# Patient Record
Sex: MALE | Race: WHITE | NOT HISPANIC OR LATINO | Employment: FULL TIME | URBAN - METROPOLITAN AREA
[De-identification: names, ages, dates, MRNs, and addresses within clinical notes are randomized per-mention and may not be internally consistent; named-entity substitution may affect disease eponyms.]

---

## 2018-02-05 LAB
ALT SERPL-CCNC: 42 U/L (ref 0–78)
CHOL/HDL RATIO - HISTORICAL: 3.6
CHOLEST SERPL-MCNC: 195 MG/DL (ref 0–200)
HDLC SERPL-MCNC: 54 MG/DL (ref 40–96)
LDL/HDL RATIO: 2.3
LDLC SERPL CALC-MCNC: 126 MG/DL (ref 0–130)
TRIGL SERPL-MCNC: 72 MG/DL (ref 30–200)
VLDL - CALC: 14.4 CALC (ref 5–40)

## 2018-11-20 ENCOUNTER — TRANSFERRED RECORDS (OUTPATIENT)
Dept: HEALTH INFORMATION MANAGEMENT | Facility: CLINIC | Age: 58
End: 2018-11-20

## 2018-11-20 LAB
ALBUMIN SERPL-MCNC: 3.8 G/DL (ref 3.4–5)
ALP SERPL-CCNC: 57 U/L (ref 0–116)
ALT SERPL-CCNC: 46 U/L (ref 0–78)
AST SERPL-CCNC: 20 U/L (ref 0–37)
BILIRUB SERPL-MCNC: 2.73 MG/DL (ref 0.2–1)
BUN SERPL-MCNC: 14 MG/DL (ref 7–18)
CALCIUM SERPL-MCNC: 9.1 MG/DL (ref 8.5–10.1)
CHLORIDE SERPLBLD-SCNC: 102 MMOL/L (ref 98–107)
CREAT SERPL-MCNC: 1.02 MG/DL (ref 0.6–1.3)
GLOBULIN: 3.3 G/DL (ref 2–4)
GLUCOSE SERPL-MCNC: 94 MG/DL (ref 70–99)
POTASSIUM SERPL-SCNC: 4.8 MMOL/L (ref 3.5–5.1)
PROT SERPL-MCNC: 7.1 G/DL (ref 6.4–8.2)
SODIUM SERPL-SCNC: 138 MMOL/L (ref 136–145)

## 2018-12-05 ENCOUNTER — HOSPITAL ENCOUNTER (OUTPATIENT)
Dept: CARDIOLOGY | Facility: CLINIC | Age: 58
Discharge: HOME | End: 2018-12-05
Attending: FAMILY MEDICINE | Admitting: FAMILY MEDICINE
Payer: COMMERCIAL

## 2018-12-05 DIAGNOSIS — I48.91 ATRIAL FIBRILLATION, UNSPECIFIED TYPE (H): ICD-10-CM

## 2018-12-05 PROCEDURE — 93306 TTE W/DOPPLER COMPLETE: CPT

## 2018-12-05 PROCEDURE — 93306 TTE W/DOPPLER COMPLETE: CPT | Mod: 26 | Performed by: INTERNAL MEDICINE

## 2019-04-01 ENCOUNTER — PRE VISIT (OUTPATIENT)
Dept: CARDIOLOGY | Facility: CLINIC | Age: 59
End: 2019-04-01

## 2019-04-03 ENCOUNTER — DOCUMENTATION ONLY (OUTPATIENT)
Dept: CARDIOLOGY | Facility: CLINIC | Age: 59
End: 2019-04-03

## 2019-04-08 ENCOUNTER — OFFICE VISIT (OUTPATIENT)
Dept: CARDIOLOGY | Facility: CLINIC | Age: 59
End: 2019-04-08
Payer: COMMERCIAL

## 2019-04-08 VITALS
WEIGHT: 232.5 LBS | BODY MASS INDEX: 31.49 KG/M2 | SYSTOLIC BLOOD PRESSURE: 158 MMHG | HEART RATE: 76 BPM | DIASTOLIC BLOOD PRESSURE: 98 MMHG | HEIGHT: 72 IN

## 2019-04-08 DIAGNOSIS — I48.0 PAROXYSMAL ATRIAL FIBRILLATION (H): Primary | ICD-10-CM

## 2019-04-08 PROCEDURE — 93000 ELECTROCARDIOGRAM COMPLETE: CPT | Performed by: INTERNAL MEDICINE

## 2019-04-08 PROCEDURE — 99204 OFFICE O/P NEW MOD 45 MIN: CPT | Performed by: INTERNAL MEDICINE

## 2019-04-08 RX ORDER — OMEGA-3 FATTY ACIDS/FISH OIL 300-1000MG
200 CAPSULE ORAL EVERY 4 HOURS PRN
COMMUNITY

## 2019-04-08 ASSESSMENT — MIFFLIN-ST. JEOR: SCORE: 1912.61

## 2019-04-08 NOTE — LETTER
4/8/2019    Troy Wilkins MD  [facility] [address]  [facility] [address]    RE: Dennys Odom       Dear Colleague,    I had the pleasure of seeing Dennys Roachpekandis in the [facility].    HPI and Plan:   This is a 58-year-old gentleman referred because of episodes of atrial fibrillation.    Around 2014 he developed palpitations one day although no other symptoms.  He felt his heart rate was rapid and irregular.  He saw his physician and was diagnosed with atrial fibrillation.  He was placed on anticoagulation at the episode went away after a day and a half and so a month later the anticoagulation was stopped.  He states he was unaware of any other episodes until November 2018 when he had the same symptoms.  Again a rapid irregular rhythm that he was aware of easily but he had no other symptoms and was able to do normal activities.  He again saw his clinic physician who got an EKG, which I reviewed, and this showed atrial fibrillation with rapid ventricular response and a heart rate in the 130s.  At that time was felt his chads vascular score was 0 and so anticoagulation was not recommended.  He was observed in the episode resolved again about a day and a half.  A subsequent echocardiogram was unremarkable although there was some possible mild left atrial enlargement.  He has had no further episodes since then.    He admits he is a significant snorer and somewhat sleeper but his wife has not commented on apneic episodes.  There is no history of hypertension, diabetes, dyslipidemia, COPD, tobacco.  He has 4 female siblings is some older same younger no history of atrial fibrillation or arrhythmias in them.  His parents had no cardiovascular issues when they were alive.      Most recent outside labs are reviewed from fall 2018 showing normal electrolytes, normal renal function, normal fasting glucose.  I do not see a TSH or thyroid functions.  He did  have mild hyperlipidemia with , HDL 54, total cholesterol 195, triglycerides 72     EKG today shows him to be in sinus rhythm and is essentially within normal limits.  Echo report is as noted above.    Impression/plan  1-apparently rare paroxysmal atrial fibrillation.  Agree he has no significant chads vascular score .  I do wonder if he has some sleep apnea that is predisposing him.  I also wonder if he is having more episodes during the night that he is not aware of - it would be useful to know this.  I have recommended the following:  -Overnight home oximetry  -A 14-day Zio patch.      Further recommendations will await the results of those.  If the studies are normal I would continue having him regular check his pulse but otherwise no further evaluation or management at this time.    2-I also spent some time will discuss with him appropriate lifestyle including exercise and Mediterranean type diet.          Orders Placed This Encounter   Procedures     EKG 12-lead complete w/read - Clinics (performed today)     Overnight oximetry study     Zio Patch Holter Adult Pediatric Greater than 48 hrs       Orders Placed This Encounter   Medications     ibuprofen (ADVIL) 200 MG capsule     Sig: Take 200 mg by mouth every 4 hours as needed for fever       There are no discontinued medications.      Encounter Diagnosis   Name Primary?     Paroxysmal atrial fibrillation (H) Yes       CURRENT MEDICATIONS:  Current Outpatient Medications   Medication Sig Dispense Refill     ibuprofen (ADVIL) 200 MG capsule Take 200 mg by mouth every 4 hours as needed for fever         ALLERGIES     Allergies   Allergen Reactions     Penicillins Unknown     Noted per records from [facility]       PAST MEDICAL HISTORY:  Past Medical History:   Diagnosis Date     Atrial fibrillation (H)      Chronic maxillary sinusitis      Hyperlipidemia      Right knee pain      Snoring        PAST SURGICAL HISTORY:  No past surgical  history on file.    FAMILY HISTORY:  Family History   Problem Relation Age of Onset     No Known Problems Mother      Hypertension Father      Obesity Father      No Known Problems Sister      No Known Problems Sister      No Known Problems Sister      No Known Problems Sister        SOCIAL HISTORY:  Social History     Socioeconomic History     Marital status:      Spouse name: None     Number of children: None     Years of education: None     Highest education level: None   Occupational History     None   Social Needs     Financial resource strain: None     Food insecurity:     Worry: None     Inability: None     Transportation needs:     Medical: None     Non-medical: None   Tobacco Use     Smoking status: Never Smoker     Smokeless tobacco: Former User   Substance and Sexual Activity     Alcohol use: Yes     Comment: a couple beers on weekends     Drug use: None     Sexual activity: None   Lifestyle     Physical activity:     Days per week: None     Minutes per session: None     Stress: None   Relationships     Social connections:     Talks on phone: None     Gets together: None     Attends Pentecostal service: None     Active member of club or organization: None     Attends meetings of clubs or organizations: None     Relationship status: None     Intimate partner violence:     Fear of current or ex partner: None     Emotionally abused: None     Physically abused: None     Forced sexual activity: None   Other Topics Concern     None   Social History Narrative     None       Review of Systems:  Skin:  Negative       Eyes:  Positive for glasses reading glasses  ENT:  Positive for hearing loss hearing aids; clearing his throat a lot  Respiratory:  Positive for   snores   Cardiovascular:  Negative      Gastroenterology: Negative      Genitourinary:  Negative      Musculoskeletal:  Positive for joint pain    Neurologic:  Negative      Psychiatric:  Positive for sleep disturbances    Heme/Lymph/Imm:  Positive for  allergies RX  Endocrine:  Negative        Physical Exam:  Vitals: BP (!) 158/98 (BP Location: Right arm, Patient Position: Chair, Cuff Size: Adult Large)   Pulse 76   Ht 1.829 m (6')   Wt 105.5 kg (232 lb 8 oz)   BMI 31.53 kg/m       Constitutional:  cooperative, alert and oriented, well developed, well nourished, in no acute distress        Skin:  warm and dry to the touch, no apparent skin lesions or masses noted          Head:  normocephalic, no masses or lesions        Eyes:  pupils equal and round;sclera white;no xanthalasma;EOMS intact        Lymph:      ENT:  no pallor or cyanosis, dentition good        Neck:  carotid pulses are full and equal bilaterally, JVP normal, no carotid bruit        Respiratory:  normal breath sounds, clear to auscultation, normal A-P diameter, normal symmetry, normal respiratory excursion, no use of accessory muscles         Cardiac: regular rhythm, normal S1/S2, no S3 or S4, apical impulse not displaced, no murmurs, gallops or rubs                pulses full and equal, no bruits auscultated                                        GI:  abdomen soft;BS normoactive;no HSM;non-tender;no bruits        Extremities and Muscular Skeletal:  no deformities, clubbing, cyanosis, erythema observed;no edema              Neurological:  no gross motor deficits        Psych:  affect appropriate, oriented to time, person and place        CC  No referring provider defined for this encounter.                Thank you for allowing me to participate in the care of your patient.      Sincerely,     Jason Desir MD     [facility]    cc:   No referring provider defined for this encounter.

## 2019-04-08 NOTE — PROGRESS NOTES
HPI and Plan:   This is a 58-year-old gentleman referred because of episodes of atrial fibrillation.    Around 2014 he developed palpitations one day although no other symptoms.  He felt his heart rate was rapid and irregular.  He saw his physician and was diagnosed with atrial fibrillation.  He was placed on anticoagulation at the episode went away after a day and a half and so a month later the anticoagulation was stopped.  He states he was unaware of any other episodes until November 2018 when he had the same symptoms.  Again a rapid irregular rhythm that he was aware of easily but he had no other symptoms and was able to do normal activities.  He again saw his clinic physician who got an EKG, which I reviewed, and this showed atrial fibrillation with rapid ventricular response and a heart rate in the 130s.  At that time was felt his chads vascular score was 0 and so anticoagulation was not recommended.  He was observed in the episode resolved again about a day and a half.  A subsequent echocardiogram was unremarkable although there was some possible mild left atrial enlargement.  He has had no further episodes since then.    He admits he is a significant snorer and somewhat sleeper but his wife has not commented on apneic episodes.  There is no history of hypertension, diabetes, dyslipidemia, COPD, tobacco.  He has 4 female siblings is some older same younger no history of atrial fibrillation or arrhythmias in them.  His parents had no cardiovascular issues when they were alive.      Most recent outside labs are reviewed from fall 2018 showing normal electrolytes, normal renal function, normal fasting glucose.  I do not see a TSH or thyroid functions.  He did have mild hyperlipidemia with , HDL 54, total cholesterol 195, triglycerides 72     EKG today shows him to be in sinus rhythm and is essentially within normal limits.  Echo report is as noted above.    Impression/plan  1-apparently rare paroxysmal  atrial fibrillation.  Agree he has no significant chads vascular score .  I do wonder if he has some sleep apnea that is predisposing him.  I also wonder if he is having more episodes during the night that he is not aware of - it would be useful to know this.  I have recommended the following:  -Overnight home oximetry  -A 14-day Zio patch.      Further recommendations will await the results of those.  If the studies are normal I would continue having him regular check his pulse but otherwise no further evaluation or management at this time.    2-I also spent some time will discuss with him appropriate lifestyle including exercise and Mediterranean type diet.          Orders Placed This Encounter   Procedures     EKG 12-lead complete w/read - Clinics (performed today)     Overnight oximetry study     Zio Patch Holter Adult Pediatric Greater than 48 hrs       Orders Placed This Encounter   Medications     ibuprofen (ADVIL) 200 MG capsule     Sig: Take 200 mg by mouth every 4 hours as needed for fever       There are no discontinued medications.      Encounter Diagnosis   Name Primary?     Paroxysmal atrial fibrillation (H) Yes       CURRENT MEDICATIONS:  Current Outpatient Medications   Medication Sig Dispense Refill     ibuprofen (ADVIL) 200 MG capsule Take 200 mg by mouth every 4 hours as needed for fever         ALLERGIES     Allergies   Allergen Reactions     Penicillins Unknown     Noted per records from [facility]       PAST MEDICAL HISTORY:  Past Medical History:   Diagnosis Date     Atrial fibrillation (H)      Chronic maxillary sinusitis      Hyperlipidemia      Right knee pain      Snoring        PAST SURGICAL HISTORY:  No past surgical history on file.    FAMILY HISTORY:  Family History   Problem Relation Age of Onset     No Known Problems Mother      Hypertension Father      Obesity Father      No Known Problems Sister      No Known Problems Sister      No Known Problems Sister      No  Known Problems Sister        SOCIAL HISTORY:  Social History     Socioeconomic History     Marital status:      Spouse name: None     Number of children: None     Years of education: None     Highest education level: None   Occupational History     None   Social Needs     Financial resource strain: None     Food insecurity:     Worry: None     Inability: None     Transportation needs:     Medical: None     Non-medical: None   Tobacco Use     Smoking status: Never Smoker     Smokeless tobacco: Former User   Substance and Sexual Activity     Alcohol use: Yes     Comment: a couple beers on weekends     Drug use: None     Sexual activity: None   Lifestyle     Physical activity:     Days per week: None     Minutes per session: None     Stress: None   Relationships     Social connections:     Talks on phone: None     Gets together: None     Attends Buddhist service: None     Active member of club or organization: None     Attends meetings of clubs or organizations: None     Relationship status: None     Intimate partner violence:     Fear of current or ex partner: None     Emotionally abused: None     Physically abused: None     Forced sexual activity: None   Other Topics Concern     None   Social History Narrative     None       Review of Systems:  Skin:  Negative       Eyes:  Positive for glasses reading glasses  ENT:  Positive for hearing loss hearing aids; clearing his throat a lot  Respiratory:  Positive for   snores   Cardiovascular:  Negative      Gastroenterology: Negative      Genitourinary:  Negative      Musculoskeletal:  Positive for joint pain    Neurologic:  Negative      Psychiatric:  Positive for sleep disturbances    Heme/Lymph/Imm:  Positive for allergies RX  Endocrine:  Negative        Physical Exam:  Vitals: BP (!) 158/98 (BP Location: Right arm, Patient Position: Chair, Cuff Size: Adult Large)   Pulse 76   Ht 1.829 m (6')   Wt 105.5 kg (232 lb 8 oz)   BMI 31.53 kg/m      Constitutional:   cooperative, alert and oriented, well developed, well nourished, in no acute distress        Skin:  warm and dry to the touch, no apparent skin lesions or masses noted          Head:  normocephalic, no masses or lesions        Eyes:  pupils equal and round;sclera white;no xanthalasma;EOMS intact        Lymph:      ENT:  no pallor or cyanosis, dentition good        Neck:  carotid pulses are full and equal bilaterally, JVP normal, no carotid bruit        Respiratory:  normal breath sounds, clear to auscultation, normal A-P diameter, normal symmetry, normal respiratory excursion, no use of accessory muscles         Cardiac: regular rhythm, normal S1/S2, no S3 or S4, apical impulse not displaced, no murmurs, gallops or rubs                pulses full and equal, no bruits auscultated                                        GI:  abdomen soft;BS normoactive;no HSM;non-tender;no bruits        Extremities and Muscular Skeletal:  no deformities, clubbing, cyanosis, erythema observed;no edema              Neurological:  no gross motor deficits        Psych:  affect appropriate, oriented to time, person and place        CC  No referring provider defined for this encounter.

## 2019-04-08 NOTE — LETTER
4/8/2019    Troy Wilkins MD  [facility] [address]  [facility] [address]    RE: Dennys Odom       Dear Colleague,    I had the pleasure of seeing Dennys Roachpekandis in the [facility].    HPI and Plan:   This is a 58-year-old gentleman referred because of episodes of atrial fibrillation.    Around 2014 he developed palpitations one day although no other symptoms.  He felt his heart rate was rapid and irregular.  He saw his physician and was diagnosed with atrial fibrillation.  He was placed on anticoagulation at the episode went away after a day and a half and so a month later the anticoagulation was stopped.  He states he was unaware of any other episodes until November 2018 when he had the same symptoms.  Again a rapid irregular rhythm that he was aware of easily but he had no other symptoms and was able to do normal activities.  He again saw his clinic physician who got an EKG, which I reviewed, and this showed atrial fibrillation with rapid ventricular response and a heart rate in the 130s.  At that time was felt his chads vascular score was 0 and so anticoagulation was not recommended.  He was observed in the episode resolved again about a day and a half.  A subsequent echocardiogram was unremarkable although there was some possible mild left atrial enlargement.  He has had no further episodes since then.    He admits he is a significant snorer and somewhat sleeper but his wife has not commented on apneic episodes.  There is no history of hypertension, diabetes, dyslipidemia, COPD, tobacco.  He has 4 female siblings is some older same younger no history of atrial fibrillation or arrhythmias in them.  His parents had no cardiovascular issues when they were alive.      Most recent outside labs are reviewed from fall 2018 showing normal electrolytes, normal renal function, normal fasting glucose.  I do not see a TSH or thyroid functions.  He did  have mild hyperlipidemia with , HDL 54, total cholesterol 195, triglycerides 72     EKG today shows him to be in sinus rhythm and is essentially within normal limits.  Echo report is as noted above.    Impression/plan  1-apparently rare paroxysmal atrial fibrillation.  Agree he has no significant chads vascular score .  I do wonder if he has some sleep apnea that is predisposing him.  I also wonder if he is having more episodes during the night that he is not aware of - it would be useful to know this.  I have recommended the following:  -Overnight home oximetry  -A 14-day Zio patch.      Further recommendations will await the results of those.  If the studies are normal I would continue having him regular check his pulse but otherwise no further evaluation or management at this time.    2-I also spent some time will discuss with him appropriate lifestyle including exercise and Mediterranean type diet.          Orders Placed This Encounter   Procedures     EKG 12-lead complete w/read - Clinics (performed today)     Overnight oximetry study     Zio Patch Holter Adult Pediatric Greater than 48 hrs       Orders Placed This Encounter   Medications     ibuprofen (ADVIL) 200 MG capsule     Sig: Take 200 mg by mouth every 4 hours as needed for fever       There are no discontinued medications.      Encounter Diagnosis   Name Primary?     Paroxysmal atrial fibrillation (H) Yes       CURRENT MEDICATIONS:  Current Outpatient Medications   Medication Sig Dispense Refill     ibuprofen (ADVIL) 200 MG capsule Take 200 mg by mouth every 4 hours as needed for fever         ALLERGIES     Allergies   Allergen Reactions     Penicillins Unknown     Noted per records from [facility]       PAST MEDICAL HISTORY:  Past Medical History:   Diagnosis Date     Atrial fibrillation (H)      Chronic maxillary sinusitis      Hyperlipidemia      Right knee pain      Snoring        PAST SURGICAL HISTORY:  No past surgical  history on file.    FAMILY HISTORY:  Family History   Problem Relation Age of Onset     No Known Problems Mother      Hypertension Father      Obesity Father      No Known Problems Sister      No Known Problems Sister      No Known Problems Sister      No Known Problems Sister        SOCIAL HISTORY:  Social History     Socioeconomic History     Marital status:      Spouse name: None     Number of children: None     Years of education: None     Highest education level: None   Occupational History     None   Social Needs     Financial resource strain: None     Food insecurity:     Worry: None     Inability: None     Transportation needs:     Medical: None     Non-medical: None   Tobacco Use     Smoking status: Never Smoker     Smokeless tobacco: Former User   Substance and Sexual Activity     Alcohol use: Yes     Comment: a couple beers on weekends     Drug use: None     Sexual activity: None   Lifestyle     Physical activity:     Days per week: None     Minutes per session: None     Stress: None   Relationships     Social connections:     Talks on phone: None     Gets together: None     Attends Shinto service: None     Active member of club or organization: None     Attends meetings of clubs or organizations: None     Relationship status: None     Intimate partner violence:     Fear of current or ex partner: None     Emotionally abused: None     Physically abused: None     Forced sexual activity: None   Other Topics Concern     None   Social History Narrative     None       Review of Systems:  Skin:  Negative       Eyes:  Positive for glasses reading glasses  ENT:  Positive for hearing loss hearing aids; clearing his throat a lot  Respiratory:  Positive for   snores   Cardiovascular:  Negative      Gastroenterology: Negative      Genitourinary:  Negative      Musculoskeletal:  Positive for joint pain    Neurologic:  Negative      Psychiatric:  Positive for sleep disturbances    Heme/Lymph/Imm:  Positive for  allergies RX  Endocrine:  Negative        Physical Exam:  Vitals: BP (!) 158/98 (BP Location: Right arm, Patient Position: Chair, Cuff Size: Adult Large)   Pulse 76   Ht 1.829 m (6')   Wt 105.5 kg (232 lb 8 oz)   BMI 31.53 kg/m       Constitutional:  cooperative, alert and oriented, well developed, well nourished, in no acute distress        Skin:  warm and dry to the touch, no apparent skin lesions or masses noted          Head:  normocephalic, no masses or lesions        Eyes:  pupils equal and round;sclera white;no xanthalasma;EOMS intact        Lymph:      ENT:  no pallor or cyanosis, dentition good        Neck:  carotid pulses are full and equal bilaterally, JVP normal, no carotid bruit        Respiratory:  normal breath sounds, clear to auscultation, normal A-P diameter, normal symmetry, normal respiratory excursion, no use of accessory muscles         Cardiac: regular rhythm, normal S1/S2, no S3 or S4, apical impulse not displaced, no murmurs, gallops or rubs                pulses full and equal, no bruits auscultated                                        GI:  abdomen soft;BS normoactive;no HSM;non-tender;no bruits        Extremities and Muscular Skeletal:  no deformities, clubbing, cyanosis, erythema observed;no edema              Neurological:  no gross motor deficits        Psych:  affect appropriate, oriented to time, person and place          Thank you for allowing me to participate in the care of your patient.    Sincerely,     Jason Desir MD     [facility]

## 2019-05-08 ENCOUNTER — TRANSFERRED RECORDS (OUTPATIENT)
Dept: HEALTH INFORMATION MANAGEMENT | Facility: CLINIC | Age: 59
End: 2019-05-08

## 2019-05-08 ENCOUNTER — HOSPITAL ENCOUNTER (OUTPATIENT)
Dept: CARDIOLOGY | Facility: CLINIC | Age: 59
Discharge: HOME | End: 2019-05-08
Attending: INTERNAL MEDICINE | Admitting: INTERNAL MEDICINE
Payer: COMMERCIAL

## 2019-05-08 DIAGNOSIS — I48.0 PAROXYSMAL ATRIAL FIBRILLATION (H): ICD-10-CM

## 2019-05-08 PROCEDURE — 0296T ZIO PATCH HOLTER ADULT PEDIATRIC GREATER THAN 48 HRS: CPT

## 2019-05-08 PROCEDURE — 0298T ZIO PATCH HOLTER ADULT PEDIATRIC GREATER THAN 48 HRS: CPT | Performed by: INTERNAL MEDICINE

## 2019-05-10 DIAGNOSIS — R79.81 ABNORMAL PULSE OXIMETRY: Primary | ICD-10-CM

## 2019-05-10 DIAGNOSIS — I25.10 CORONARY ARTERY DISEASE INVOLVING NATIVE CORONARY ARTERY OF NATIVE HEART WITHOUT ANGINA PECTORIS: ICD-10-CM

## 2019-06-05 ENCOUNTER — TRANSFERRED RECORDS (OUTPATIENT)
Dept: HEALTH INFORMATION MANAGEMENT | Facility: CLINIC | Age: 59
End: 2019-06-05
Payer: COMMERCIAL

## 2022-03-24 ENCOUNTER — TRANSCRIBE ORDERS (OUTPATIENT)
Dept: OTHER | Age: 62
End: 2022-03-24
Payer: COMMERCIAL

## 2022-03-24 DIAGNOSIS — R49.9 HOARSENESS OR CHANGING VOICE: Primary | ICD-10-CM

## 2022-03-24 DIAGNOSIS — J38.3 DYSPHONIA, SPASMODIC: ICD-10-CM

## 2022-03-30 NOTE — TELEPHONE ENCOUNTER
"FUTURE VISIT INFORMATION      FUTURE VISIT INFORMATION:    Date: 6/14/22    Time: 1PM    [place] [facility] INFORMATION:    Referring provider:  Miguel Pizano MD    Referring providers clinic:  [facility]     Reason for visit/diagnosis  Hoarseness or changing voice, dysphonia, spasmodic; referred by Miguel Pizano MD per pt    RECORDS REQUESTED FROM:       Clinic name Comments Records Status Imaging Status   [facility]    [address]    [place] [address]    [phone]  3/23/22 note from Miguel Pizano MD    \"He states he was previously scoped in [place]\" Care everywhere     [facility]  6/5/2019, 10/24/2018 note from Dr Bruce Louie  Mailed out ADEN 5/5/22    req 5/18/22 - received 5/25/22 5/5/22 mailed out ADEN to patient - [facility]   5/18/22 8:47AM received signed ADEN via email, sent a request for recs - [facility]   5/25/22 11:34AM recs received from [facility], sent to scan - [facility]   "

## 2022-04-30 ENCOUNTER — HEALTH MAINTENANCE LETTER (OUTPATIENT)
Age: 62
End: 2022-04-30

## 2022-05-05 NOTE — TELEPHONE ENCOUNTER
Left a message with patient to see if he has any outside records. Note from Dr Pizano indicates that he was previously scoped. Left my direct number for call back [phone] (ok to leave detailed voicemail). If patient is ok with us trying to obtain these records, will need to know if he wants an ADEN emailed or mailed out to him to sign.     [facility]      Update 2:23PM:   Spoke with patient, he was seen by [facility] in 2018. Ok with an ADEN mailed to him. Confirmed address and ADEN will be sent out via [facility] with instructions on how to get it back to me.    [address] [place] [address]    *Trackin    [facility]   Clinic

## 2022-06-14 ENCOUNTER — OFFICE VISIT (OUTPATIENT)
Dept: OTOLARYNGOLOGY | Facility: CLINIC | Age: 62
End: 2022-06-14
Payer: COMMERCIAL

## 2022-06-14 ENCOUNTER — PRE VISIT (OUTPATIENT)
Dept: OTOLARYNGOLOGY | Facility: CLINIC | Age: 62
End: 2022-06-14

## 2022-06-14 ENCOUNTER — THERAPY VISIT (OUTPATIENT)
Dept: SPEECH THERAPY | Facility: CLINIC | Age: 62
End: 2022-06-14

## 2022-06-14 ENCOUNTER — VIRTUAL VISIT (OUTPATIENT)
Dept: OTOLARYNGOLOGY | Facility: CLINIC | Age: 62
End: 2022-06-14
Payer: COMMERCIAL

## 2022-06-14 VITALS
DIASTOLIC BLOOD PRESSURE: 89 MMHG | BODY MASS INDEX: 30.1 KG/M2 | HEIGHT: 71 IN | HEART RATE: 71 BPM | WEIGHT: 215 LBS | SYSTOLIC BLOOD PRESSURE: 166 MMHG | TEMPERATURE: 99 F | OXYGEN SATURATION: 99 %

## 2022-06-14 DIAGNOSIS — R49.9 HOARSENESS OR CHANGING VOICE: ICD-10-CM

## 2022-06-14 DIAGNOSIS — R13.10 DYSPHAGIA, UNSPECIFIED TYPE: Primary | ICD-10-CM

## 2022-06-14 DIAGNOSIS — R49.0 DYSPHONIA: Primary | ICD-10-CM

## 2022-06-14 DIAGNOSIS — R13.12 OROPHARYNGEAL DYSPHAGIA: ICD-10-CM

## 2022-06-14 DIAGNOSIS — J38.3 ADDUCTOR SPASMODIC DYSPHONIA: ICD-10-CM

## 2022-06-14 DIAGNOSIS — R49.0 HOARSENESS: Primary | ICD-10-CM

## 2022-06-14 DIAGNOSIS — R49.0 DYSPHONIA: ICD-10-CM

## 2022-06-14 DIAGNOSIS — J38.3 DYSPHONIA, SPASMODIC: ICD-10-CM

## 2022-06-14 PROCEDURE — 92612 ENDOSCOPY SWALLOW (FEES) VID: CPT | Mod: GN | Performed by: OTOLARYNGOLOGY

## 2022-06-14 PROCEDURE — 31579 LARYNGOSCOPY TELESCOPIC: CPT | Mod: 51 | Performed by: OTOLARYNGOLOGY

## 2022-06-14 PROCEDURE — 92610 EVALUATE SWALLOWING FUNCTION: CPT | Mod: GN | Performed by: SPEECH-LANGUAGE PATHOLOGIST

## 2022-06-14 PROCEDURE — 92613 ENDOSCOPY SWALLOW (FEES) I&R: CPT | Performed by: OTOLARYNGOLOGY

## 2022-06-14 PROCEDURE — 92524 BEHAVRAL QUALIT ANALYS VOICE: CPT | Mod: GN | Performed by: SPEECH-LANGUAGE PATHOLOGIST

## 2022-06-14 PROCEDURE — 99204 OFFICE O/P NEW MOD 45 MIN: CPT | Mod: 25 | Performed by: OTOLARYNGOLOGY

## 2022-06-14 ASSESSMENT — PAIN SCALES - GENERAL: PAINLEVEL: NO PAIN (0)

## 2022-06-14 NOTE — PROGRESS NOTES
"Dennys Odom is a 61 year old male who is being evaluated via a billable video visit.      The patient has been notified and verbally consented to the following:     This video visit will be conducted between you and your provider.    Patient has opted to conduct today's video visit vs an in-person appointment, and is not able to attend due to possible exposure to COVID-19.      If during the course of the call the provider feels a video visit is not appropriate, you will not be charged for this service.    Call initiated at: 1:00  Type of Video Platform Used: Ontodia  Location of provider: Residence  Location of patient: [facility]  Evaluation report    Clinician: Wyatt Oneal M.M., M.A., CCC/SLP  Seen in conjunction with: Dr. Christianson  Referring physician:  Dr. Pizano  Patient: Dennys Odom  Date of Visit: 6/14/2022    HISTORY  Chief complaint: Dennys Odom is a 61 year old gentleman presenting today for evaluation of voice.    Chart Review: He was seen most recently by Dr. Miguel Pizano (an outside ENT) and at that time he stated his voice has been hoarse off and on for several years, and that it is not \"working right\" and is not completely normal as it used to be.  He was previously seen for this in 2018/2019 and told that everything was normal but given vocal hygiene recommendations.  Laryngeal evaluation completed on 3/23/2022 showed essentially healthy laryngeal vocal fold mucosa but with a slight vocal tremor.  There was concern for possible spasmodic dysphonia, and the patient was referred to our clinic for further evaluation and treatment as warranted.    Today he reports that symptoms began 3-4 years ago. Short phrases are OK, but as he carries on a conversation his voice gets tight. During winter it seems to get worse (when breathing cold air). Friends ask what's wrong with his voice. Feels that he is avoiding using his voice.  A little better " "in the morning and worsens as the day progresses. Might get a bit easier with an extra drink.      Patient denies significant dyspnea, dysphagia, cough and pain.     Past Medical History:   Diagnosis Date     Atrial fibrillation (H)      Chronic maxillary sinusitis      Hyperlipidemia      Right knee pain      Snoring      No past surgical history on file.    OBJECTIVE  PATIENT REPORTED MEASURES  Patient Supplied Answers To VHI Questionnaire  Voice Handicap Index (VHI-10) 6/8/2022   My voice makes it difficult for people to hear me 2   People have difficulty understanding me in a noisy room 2   My voice difficulties restrict my personal and social life.  2   I feel left out of conversations because of my voice 0   My voice problem causes me to lose income 0   I feel as though I have to strain to produce voice 2   The clarity of my voice is unpredictable 2   My voice problem upsets me 2   My voice makes me feel handicapped 0   People ask, \"What's wrong with your voice?\" 2   VHI-10 14     Patient Supplied Answers To CSI Questionnaire  Cough Severity Index (CSI) 6/8/2022   My cough is worse when I lie down 0   My coughing problem causes me to restrict my personal and social life 0   I tend to avoid places because of my cough problem 0   I feel embarrassed because of my coughing problem 0   People ask, ''What's wrong?'' because I cough a lot 0   I run out of air when I cough 0   My coughing problem affects my voice 0   My coughing problem limits my physical activity 0   My coughing problem upsets me 0   People ask me if I am sick because I cough a lot 0   CSI Score 0     Patient Supplied Answers To EAT Questionnaire  Eating Assessment Tool (EAT-10) 6/8/2022   My swallowing problem has caused me to lose weight 0   My swallowing problem interferes with my ability to go out for meals 0   Swallowing liquids takes extra effort 0   Swallowing solids takes extra effort 0   Swallowing pills takes extra effort 0   Swallowing is " painful 0   The pleasure of eating is affected by my swallowing 0   When I swallow food sticks in my throat 0   I cough when I eat 0   Swallowing is stressful 0   EAT-10 0     PERCEPTUAL EVALUATION (CPT 54478)  POSTURE / TENSION:     neck and shoulders    BREATHING:     appears within normal limits and adequate     VOICE:    Roughness: Mild to moderate Intermittent    Breathiness: Minimal    Strain: Moderate to severe Intermittent     Spasm: Moderate Intermittent associated with voice loaded stimuli consistently across trials and varied order    MPT    /s/ - 14 seconds    /z/ - 34 seconds     Loudness    Conversational speech:  WNL    Pitch:    Sustained phonation: /z/ - 220 Hz    Conversational speech:  Mildly elevated but within functional limits    Pitch glide:       Hz    Resonance:    Conversational speech:  laryngeal pharyngeal resonance    Singing vs. Speech:  Consistent across contexts        CAPE-V Overall Severity:  57/100    COUGH/THROAT CLEARING:    Occasional    Dry    THERAPY PROBES: Improvement was elicited with coordination of respiration and phonation    LARYNGEAL EXAMINATION  Procedure: Flexible endoscopy with chip-tip technology with stroboscopy, right nostril; topical anesthesia with 3% Lidocaine and 0.25% phenylephrine was applied.   Performed by: Dr. Kathi Christianson  The laryngeal and pharyngeal structures were evaluated for gross appearance, mobility, function, and focal lesions / abnormalities of the associated mucosa.  Stroboscopy was warranted to evaluate closure, symmetry, and vibratory characteristics of the vocal folds.  All findings were within normal limits with the exception of the following salient features:     Esentially healthy laryngeal and vocal fold mucosa    Right true vocal fold frequently adopts a near midline posture intermittently    Severe 4 way constrictive hyperfunction during phonatory tasks    Most notable during voiced initial sounds    Decreasing during voiceless  initial sounds    Does improve with cues for high flow    Stroboscopy demonstrates:    Anterior gap    Posterior aspect of the vocal fold appears less dynamic with regards to amplitude of mucosal wave    Mild reduction of amplitude mucosal wave anteriorly    Essentially symmetric    Intermittent aperiodicity    The laryngeal exam was reviewed with Mr. Odom, and I provided pertinent explanations, as well as written and oral information.    ASSESSMENT / PLAN  IMPRESSIONS: Dennys Odom is presenting today with voice changes that began 3 to 4 years ago without inciting incident.  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 Laryngeal evaluation shows severe four-way constrictive supraglottic hyperfunction present across many vocal tasks but worsening in a spastic-like manner on voiced initial sounds.  Across numerous repetitions and variations in order patient consistently reported increased effort on voiced initial versus voiceless initial loaded stimuli.  These findings are consistent with perceptual evaluation which is dominated by strain with intermittent exacerbations on voiced sounds.  Patient was stimulable for improvement with focus on increasing respiratory flow but moments of strain are clearly still appreciated.    DETAILED RECOMMENDATIONS:     Various treatment options including taking no action, Botox injections, and speech therapy alone or a combination thereof were discussed with the patient.      Both Dr. Christianson and I feel strongly that a joint approach of Botox with adjuvant speech therapy is going to be required to achieve best results, but patient was encouraged to think about these options before pursuing, and at this point he would like to move forward with speech therapy and reengage for possible  Botox in the future.      He demonstrates a Good prognosis for improvement given adherence to therapeutic recommendations.   o Positive indicators: positive response to therapy probes diagnosis is known to respond to treatment  o Negative indicators: Definite neurogenic component to symptoms    DURATION / FREQUENCY: 4 biweekly and 2 monthly one-hour sessions    Research: This patient is not a candidate.      GOALS:  Patient goal:     To improve and maintain a healthy voice quality    To understand the problem and fix it as much as possible    Short-term goal(s): Within the first 4 sessions, Mr. Odom:    will demonstrate assigned laryngeal massage techniques with 80% accuracy or better with no clinician support    will utilize silent inhalation with good low-respiratory engagement 75% of the time during therapy tasks with minimal clinician support    will demonstrate semi-occluded vocal tract (SOVT) exercises with at least 80% accuracy with no clinician support    will accurately identify target vs. habitual voice quality during therapy tasks in 4 out of 5 trials with no clinician support    will demonstrate the ability to alternate between target and habitual voice quality given clinician cue 75% of the time during therapy tasks    Long-term goal(s): In 6 months, Mr. Odom will:    Report a week of typical activities, in which Dysphonia does not exceed a level of 3 out of 10, 80% of the time    This treatment plan was developed with the patient who agreed with the recommendations.    TOTAL SERVICE TIME: 60 minutes  EVALUATION OF VOICE AND RESONANCE (02840)  NO CHARGE FACILITY FEE (47198)    Wyatt Oneal M.M., M.A., CCC-SLP  Speech-Language Pathologist  Certificate of Vocology  [phone]    *this report was created in part through the use of computerized dictation software, and though reviewed following completion, some typographic errors may persist.  If there is confusion regarding any of this  notes contents, please contact me for clarification.*

## 2022-06-14 NOTE — NURSING NOTE
"Chief Complaint   Patient presents with     Consult     Hoarseness and spasmodic dysphonia    Blood pressure (!) 166/89, pulse 71, temperature 99  F (37.2  C), temperature source Temporal, height 1.803 m (5' 11\"), weight 97.5 kg (215 lb), SpO2 99 %. Giovanna Louie, EMT  "

## 2022-06-14 NOTE — LETTER
"6/14/2022       RE: Dennys Odom  [address] [facility] [address]     Dear Colleague,    Thank you for referring your patient, Dennys Odom, to the [facility] [place] [facility]. Please see a copy of my visit note below.    Dennys Odom is a 61 year old male who is being evaluated via a billable video visit.      The patient has been notified and verbally consented to the following:     This video visit will be conducted between you and your provider.    Patient has opted to conduct today's video visit vs an in-person appointment, and is not able to attend due to possible exposure to COVID-19.      If during the course of the call the provider feels a video visit is not appropriate, you will not be charged for this service.    Call initiated at: 1:00  Type of Video Platform Used: ThinkSmart  Location of provider: Residence  Location of patient: [facility]  Evaluation report    Clinician: Wyatt Oneal M.M., M.A., CCC/SLP  Seen in conjunction with: Dr. Christianson  Referring physician:  Dr. Pizano  Patient: Dennys Odom  Date of Visit: 6/14/2022    HISTORY  Chief complaint: Dennys Odom is a 61 year old gentleman presenting today for evaluation of voice.    Chart Review: He was seen most recently by Dr. Miguel Pizano (an outside ENT) and at that time he stated his voice has been hoarse off and on for several years, and that it is not \"working right\" and is not completely normal as it used to be.  He was previously seen for this in 2018/2019 and told that everything was normal but given vocal hygiene recommendations.  Laryngeal evaluation completed on 3/23/2022 showed essentially healthy laryngeal vocal fold mucosa but with a slight vocal tremor.  There was concern for possible spasmodic dysphonia, and the patient was referred to our clinic for further evaluation " "and treatment as warranted.    Today he reports that symptoms began 3-4 years ago. Short phrases are OK, but as he carries on a conversation his voice gets tight. During winter it seems to get worse (when breathing cold air). Friends ask what's wrong with his voice. Feels that he is avoiding using his voice.  A little better in the morning and worsens as the day progresses. Might get a bit easier with an extra drink.      Patient denies significant dyspnea, dysphagia, cough and pain.     Past Medical History:   Diagnosis Date     Atrial fibrillation (H)      Chronic maxillary sinusitis      Hyperlipidemia      Right knee pain      Snoring      No past surgical history on file.    OBJECTIVE  PATIENT REPORTED MEASURES  Patient Supplied Answers To VHI Questionnaire  Voice Handicap Index (VHI-10) 6/8/2022   My voice makes it difficult for people to hear me 2   People have difficulty understanding me in a noisy room 2   My voice difficulties restrict my personal and social life.  2   I feel left out of conversations because of my voice 0   My voice problem causes me to lose income 0   I feel as though I have to strain to produce voice 2   The clarity of my voice is unpredictable 2   My voice problem upsets me 2   My voice makes me feel handicapped 0   People ask, \"What's wrong with your voice?\" 2   VHI-10 14     Patient Supplied Answers To CSI Questionnaire  Cough Severity Index (CSI) 6/8/2022   My cough is worse when I lie down 0   My coughing problem causes me to restrict my personal and social life 0   I tend to avoid places because of my cough problem 0   I feel embarrassed because of my coughing problem 0   People ask, ''What's wrong?'' because I cough a lot 0   I run out of air when I cough 0   My coughing problem affects my voice 0   My coughing problem limits my physical activity 0   My coughing problem upsets me 0   People ask me if I am sick because I cough a lot 0   CSI Score 0     Patient Supplied Answers To " EAT Questionnaire  Eating Assessment Tool (EAT-10) 6/8/2022   My swallowing problem has caused me to lose weight 0   My swallowing problem interferes with my ability to go out for meals 0   Swallowing liquids takes extra effort 0   Swallowing solids takes extra effort 0   Swallowing pills takes extra effort 0   Swallowing is painful 0   The pleasure of eating is affected by my swallowing 0   When I swallow food sticks in my throat 0   I cough when I eat 0   Swallowing is stressful 0   EAT-10 0     PERCEPTUAL EVALUATION (CPT 07534)  POSTURE / TENSION:     neck and shoulders    BREATHING:     appears within normal limits and adequate     VOICE:    Roughness: Mild to moderate Intermittent    Breathiness: Minimal    Strain: Moderate to severe Intermittent     Spasm: Moderate Intermittent associated with voice loaded stimuli consistently across trials and varied order    MPT    /s/ - 14 seconds    /z/ - 34 seconds     Loudness    Conversational speech:  WNL    Pitch:    Sustained phonation: /z/ - 220 Hz    Conversational speech:  Mildly elevated but within functional limits    Pitch glide:       Hz    Resonance:    Conversational speech:  laryngeal pharyngeal resonance    Singing vs. Speech:  Consistent across contexts        CAPE-V Overall Severity:  57/100    COUGH/THROAT CLEARING:    Occasional    Dry    THERAPY PROBES: Improvement was elicited with coordination of respiration and phonation    LARYNGEAL EXAMINATION  Procedure: Flexible endoscopy with chip-tip technology with stroboscopy, right nostril; topical anesthesia with 3% Lidocaine and 0.25% phenylephrine was applied.   Performed by: Dr. Kathi Christianson  The laryngeal and pharyngeal structures were evaluated for gross appearance, mobility, function, and focal lesions / abnormalities of the associated mucosa.  Stroboscopy was warranted to evaluate closure, symmetry, and vibratory characteristics of the vocal folds.  All findings were within normal limits with  the exception of the following salient features:     Esentially healthy laryngeal and vocal fold mucosa    Right true vocal fold frequently adopts a near midline posture intermittently    Severe 4 way constrictive hyperfunction during phonatory tasks    Most notable during voiced initial sounds    Decreasing during voiceless initial sounds    Does improve with cues for high flow    Stroboscopy demonstrates:    Anterior gap    Posterior aspect of the vocal fold appears less dynamic with regards to amplitude of mucosal wave    Mild reduction of amplitude mucosal wave anteriorly    Essentially symmetric    Intermittent aperiodicity    The laryngeal exam was reviewed with Mr. Odom, and I provided pertinent explanations, as well as written and oral information.    ASSESSMENT / PLAN  IMPRESSIONS: Dennys Odom is presenting today with voice changes that began 3 to 4 years ago without inciting incident.  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 Laryngeal evaluation shows severe four-way constrictive supraglottic hyperfunction present across many vocal tasks but worsening in a spastic-like manner on voiced initial sounds.  Across numerous repetitions and variations in order patient consistently reported increased effort on voiced initial versus voiceless initial loaded stimuli.  These findings are consistent with perceptual evaluation which is dominated by strain with intermittent exacerbations on voiced sounds.  Patient was stimulable for improvement with focus on increasing respiratory flow but moments of strain are clearly still appreciated.    DETAILED RECOMMENDATIONS:     Various treatment options including taking no action, Botox injections, and speech therapy alone or a combination thereof were discussed with  the patient.      Both Dr. Christianson and I feel strongly that a joint approach of Botox with adjuvant speech therapy is going to be required to achieve best results, but patient was encouraged to think about these options before pursuing, and at this point he would like to move forward with speech therapy and reengage for possible Botox in the future.      He demonstrates a Good prognosis for improvement given adherence to therapeutic recommendations.   o Positive indicators: positive response to therapy probes diagnosis is known to respond to treatment  o Negative indicators: Definite neurogenic component to symptoms    DURATION / FREQUENCY: 4 biweekly and 2 monthly one-hour sessions    Research: This patient is not a candidate.      GOALS:  Patient goal:     To improve and maintain a healthy voice quality    To understand the problem and fix it as much as possible    Short-term goal(s): Within the first 4 sessions, Mr. Odom:    will demonstrate assigned laryngeal massage techniques with 80% accuracy or better with no clinician support    will utilize silent inhalation with good low-respiratory engagement 75% of the time during therapy tasks with minimal clinician support    will demonstrate semi-occluded vocal tract (SOVT) exercises with at least 80% accuracy with no clinician support    will accurately identify target vs. habitual voice quality during therapy tasks in 4 out of 5 trials with no clinician support    will demonstrate the ability to alternate between target and habitual voice quality given clinician cue 75% of the time during therapy tasks    Long-term goal(s): In 6 months, Mr. Odom will:    Report a week of typical activities, in which Dysphonia does not exceed a level of 3 out of 10, 80% of the time    This treatment plan was developed with the patient who agreed with the recommendations.    TOTAL SERVICE TIME: 60 minutes  EVALUATION OF VOICE AND RESONANCE (91539)  NO CHARGE FACILITY FEE  (48785)    Wyatt Oneal M.M., M.A., CCC-SLP  Speech-Language Pathologist  Certificate of Vocology  [phone]    *this report was created in part through the use of computerized dictation software, and though reviewed following completion, some typographic errors may persist.  If there is confusion regarding any of this notes contents, please contact me for clarification.*        Again, thank you for allowing me to participate in the care of your patient.      Sincerely,    Robbin Oneal, SLP

## 2022-06-14 NOTE — PROGRESS NOTES
[facility]   at the [facility]   Otolaryngology Clinic     Patient: Dennys Odom    MRN: [id number]    : 1960    Age/Gender: 61 year old male  Date of Service: 2022  Rendering Provider:   Kathi Christianson MD     Referring Provider   PCP: Troy Wilkins  Referring Physician: Miguel Pizano MD  [facility]  [address]  [place] [address]  Reason for Consultation   Dysphonia  History   HISTORY OF PRESENT ILLNESS: Mr. Odom is a 61 year old male who presents to us today with dysphonia.      he presents today for evaluation. he reports:    Dysphonia  - noticed hoarse voice 3-4 years ago unknown cause  - voice becomes tight and cuts off intermittently  - able to get short phrases out  - worse when breathing cold air in  - has been seen at other places and was told things look healthy structurally  - Dr. 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  - avoiding voice due to effort increasing  - worsens a little throughout day  - gets a bit better after a break  - friends are noticing his voice changes  - laughing is normal voice  - speaking in high voice does not make it easier  - dry throat in the morning  - snores at night  - no surgeries in the neck  - no strain of neck muscles in the day  - never tried voice therapy  - no family history of voice problems  - average voice demand  - patient is retired    Dysphagia  - denies    Dyspnea  - denies    Throat clearing/cough  - clearing throat to try to help his voice improve    GERD/LPRD   - denies    PAST MEDICAL HISTORY:   Past Medical History:   Diagnosis Date     Atrial fibrillation (H)      Chronic maxillary sinusitis      Hyperlipidemia      Right knee pain      Snoring        PAST SURGICAL HISTORY: No past surgical history on file.    CURRENT MEDICATIONS:   Current Outpatient Medications:      ibuprofen (ADVIL) 200 MG capsule, Take 200 mg by mouth every 4 hours as needed for fever,  Disp: , Rfl:     ALLERGIES: Penicillins    SOCIAL HISTORY:    Social History     Socioeconomic History     Marital status:      Spouse name: Not on file     Number of children: Not on file     Years of education: Not on file     Highest education level: Not on file   Occupational History     Not on file   Tobacco Use     Smoking status: Never Smoker     Smokeless tobacco: Former User   Substance and Sexual Activity     Alcohol use: Yes     Comment: a couple beers on weekends     Drug use: Not on file     Sexual activity: Not on file   Other Topics Concern     Not on file   Social History Narrative     Not on file     Social Determinants of Health     Financial Resource Strain: Not on file   Food Insecurity: Not on file   Transportation Needs: Not on file   Physical Activity: Not on file   Stress: Not on file   Social Connections: Not on file   Intimate Partner Violence: Not on file   Housing Stability: Not on file         FAMILY HISTORY:   Family History   Problem Relation Age of Onset     No Known Problems Mother      Hypertension Father      Obesity Father      No Known Problems Sister      No Known Problems Sister      No Known Problems Sister      No Known Problems Sister      Non-contributory for problems with anesthesia    REVIEW OF SYSTEMS:   The patient was asked a 14 point review of systems regarding constitutional symptoms, eye symptoms, ears, nose, mouth, throat symptoms, cardiovascular symptoms, respiratory symptoms, gastrointestinal symptoms, genitourinary symptoms, musculoskeletal symptoms, integumentary symptoms, neurological symptoms, psychiatric symptoms, endocrine symptoms, hematologic/lymphatic symptoms, and allergic/ immunologic symptoms.   The pertinent factors have been included in the HPI and below.  Patient Supplied Answers to Review of Systems   ENT ROS 6/8/2022   Ears, Nose, Throat: Hearing loss, Ringing/noise in ears       Physical Examination   The patient underwent a physical  examination as described below. The pertinent positive and negative findings are summarized after the description of the examination.  Constitutional: The patient's developmental and nutritional status was assessed. The patient's voice quality was assessed.  Head and Face: The head and face were inspected for deformities. The sinuses were palpated. The salivary glands were palpated. Facial muscle strength was assessed bilaterally.  Eyes: Extraocular movements and primary gaze alignment were assessed.  Ears, Nose, Mouth and Throat: The ears and nose were examined for deformities. The nasal septum, mucosa, and turbinates were inspected by anterior rhinoscopy. The lips, teeth, and gums were examined for abnormalities. The oral mucosa, tongue, palate, tonsils, lateral and posterior pharynx were inspected for the presence of asymmetry or mucosal lesions.    Neck: The tracheal position was noted, and the neck mass palpated to determine if there were any asymmetries, abnormal neck masses, thyromegally, or thyroid nodules.  Respiratory: The nature of the breathing and chest expansion/symmetry was observed.  Cardiovascular: The patient was examined to determine the presence of any edema or jugular venous distension.  Abdomen: The contour of the abdomen was noted.  Lymphatic: The patient was examined for infraclavicular lymphadenopathy.  Musculoskeletal: The patient was inspected for the presence of skeletal deformities.  Extremities: The extremities were examined for any clubbing or cyanosis.  Skin: The skin was examined for inflammatory or neoplastic conditions.  Neurologic: The patient's orientation, mood, and affect were noted. The cranial nerve  functions were examined.  Other pertinent positive and negative findings on physical examination:      OC/OP: no lesions, uvula midline, soft palate elevates symmetrically, palatal tremor, FOM/BOT soft  Neck: no lesions, no TH tenderness to palpation    All other physical  examination findings were within normal limits and noncontributory.  Procedures   Video Laryngoscopy with Stroboscopy (CPT 38229) and Behavioral & Qualitative Evaluation of Voice and Resonence     Preoperative Diagnosis:  Dysphonia and throat symptoms  Postoperative Diagnosis: Dysphonia and throat symptoms  Indication:  Patient has new or persistent dysphonia and throat symptoms.  This requires evaluation by stroboscopy to fully delineate the laryngeal functioning; especially mucosal wave assessment, ultrasharp visualization of lesions on the vocal folds, and overall functioning of the larynx.  Details of Procedure: A 70 degree rigid telescopic laryngoscope or a distal chip flexible scope was used. The lighting was obtained via a light cable connected to a stroboscopic unit. The telescope was inserted either transorally or transnasally until the vocal folds could be visualized. The patient was instructed to sustain the vowel  ee  at a comfortable pitch and loudness as the voice was monitored by a microphone connected to a fundamental frequency tracker. This circuit tracked vocal periodicity, allowing the light to flash in synchrony with the glottal cycles. A setting on the stroboscope was set to change the phase of light strobing with relation to the vocal fundamental frequency, producing an image of 1 to 2 glottal cycles every second. The video images were recorded for analysis. Use of the variable speed, slow and stop scan allowed careful study of pertinent segments of laryngeal function to increase accuracy of clinical assessments of function and dysfunction.  In particular, features of glottal closure, mucosal wave on the vocal fold cover and laryngeal symmetry were analyzed. Lastly, the patient was asked to phonate speech samples and auditory/perceptual evaluation of voice and resonance were performed.  The vocal quality was carefully evaluated for hoarseness, breathiness, loudness, phrase length and  "intelligibility to determine the source of dysphonia.    Findings:   B. LARYNGOVIDEOSTROBOSCOPY   Anatomic/Physiological Deviations:  LNC, supraglottic hyperfunction  Mucosal wave: Right:  No restriction     Left: No restriction  Bilateral Vocal Fold Vibration: Symmetric  Vocal Process: Right: No restriction    Left:  No restriction  Vocal Fold closure: Complete glottal closure    Complication(s): None  Disposition: Patient tolerated the procedure well           Fiberoptic Endoscopic Evaluation of Swallowing (CPT 11173)  and Interpretation of Swallowing (CPT 13153)    Indications: See above notes for complete history and physical. Patient complains of dysphagia and/or there is suggestion on history and endoscopic exam of the presence of dysphagia causing medical complaints.  Swallowing evaluation is being performed to assess the presence and degree of dysphagia, and to recommend a safe diet.     Pulmonary Status:  No PNA   Current Diet:              regular                                        thin liquids      Consistency Amounts:  Thin Liquid: sip   Puree: sip  Solid: cookies            Positioning: upright in a chair  Oral Peripheral Exam: See physical exam section.  Anatomic Notes: See Videostroboscopy report for assessment of anatomy and laryngeal functioning  Pharyngeal secretions prior to administration of liquid or food: No  Oral Phase Abnormal Findings: No abnormal behavior observed  Behavioral Adaptations: No abnormal behavior observed  Pharyngeal Phase Abnormal Findings: no penetration, no aspiration and no residue    Recommended Diet:  regular                                        thin liquids              Review of Relevant Clinical Data   I personally reviewed:  Notes: 3/23/22 Miguel Pizano DO  He notes his voice is more hoarse, isn't \"working right,\" and he cannot hit the notes when singing that he used to be able to. His voice is worse when he has a cold. He states he was previously scoped in " "[place], told everything was normal, and was advised to drink more water at night. He denies a prior history of surgery on his vocal cords, neck, chest or heart surgery, or recent intubation. His sister has acid reflux. Chad takes Tums occasionally. He states his voice is better in the morning. He gets up some phlegm when he clears his throat. He retired and no longer uses his voice a lot for work like he used to. He denies problems with choking on food or inability to eat resulting in weight loss. He denies tremors elsewhere in his body.  Labs:  No results found for: TSH  Lab Results   Component Value Date    .0 11/20/2018    BUN 14.0 11/20/2018     No results found for: WBC, HGB, HCT, MCV, PLT  No results found for: PT, PTT, INR  No results found for: DERIK  No components found for: RHEUMATOIDFACTOR,  RF  No results found for: CRP  No components found for: CKTOT, URICACID  No components found for: C3, C4, DSDNAAB, NDNAABIFA  No results found for: MPOAB    Patient reported Quality of Life (QOL) Measures   Patient Supplied Answers To VHI Questionnaire  Voice Handicap Index (VHI-10) 6/8/2022   My voice makes it difficult for people to hear me 2   People have difficulty understanding me in a noisy room 2   My voice difficulties restrict my personal and social life.  2   I feel left out of conversations because of my voice 0   My voice problem causes me to lose income 0   I feel as though I have to strain to produce voice 2   The clarity of my voice is unpredictable 2   My voice problem upsets me 2   My voice makes me feel handicapped 0   People ask, \"What's wrong with your voice?\" 2   VHI-10 14         Patient Supplied Answers To EAT Questionnaire  Eating Assessment Tool (EAT-10) 6/8/2022   My swallowing problem has caused me to lose weight 0   My swallowing problem interferes with my ability to go out for meals 0   Swallowing liquids takes extra effort 0   Swallowing solids takes extra effort 0   Swallowing " pills takes extra effort 0   Swallowing is painful 0   The pleasure of eating is affected by my swallowing 0   When I swallow food sticks in my throat 0   I cough when I eat 0   Swallowing is stressful 0   EAT-10 0         Patient Supplied Answers To CSI Questionnaire  Cough Severity Index (CSI) 2022   My cough is worse when I lie down 0   My coughing problem causes me to restrict my personal and social life 0   I tend to avoid places because of my cough problem 0   I feel embarrassed because of my coughing problem 0   People ask, ''What's wrong?'' because I cough a lot 0   I run out of air when I cough 0   My coughing problem affects my voice 0   My coughing problem limits my physical activity 0   My coughing problem upsets me 0   People ask me if I am sick because I cough a lot 0   CSI Score 0         Patient Supplied Answers to Dyspnea Index Questionnaire:  No flowsheet data found.    Impression & Plan     IMPRESSION: Mr. Odom is a 61 year old male who is being seen for the followin. Dysphonia  - noticed hoarse voice 3-4 years ago unknown cause  - voice becomes tight and cuts off intermittently  - able to get short phrases out  - worse when breathing cold air in  - has been seen at other places and was told things look healthy structurally  - Dr. 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 All documentation has been reviewed by the aforementioned provider prior to being entered into the official medical record.     Scribe Preparation Attestation:  Gilda MEJIA, a scribe, prepared the chart for today's encounter.     Thank you for the kind referral and for allowing me to share in the care of Mr. Odom. If you have any questions, please do not hesitate to contact me.    Kathi Christianson MD    Laryngology    [facility]  Department of  Otolaryngology - Head and Neck Surgery  Clinics & Surgery Center  [address] [place] [address]  Appointment line: [phone]  Fax: [phone]  [email or web address]

## 2022-06-14 NOTE — LETTER
2022       RE: Dennys Odom  [address] [facility] [address]     Dear Colleague,    Thank you for referring your patient, Dennys Odom, to the [facility] EAR NOSE AND THROAT CLINIC [place] at [facility]. Please see a copy of my visit note below.        [facility]   at the [facility]   Otolaryngology Clinic     Patient: Dennys Odom    MRN: [id number]    : 1960    Age/Gender: 61 year old male  Date of Service: 2022  Rendering Provider:   Kathi Christianson MD     Referring Provider   PCP: Troy Wilkins  Referring Physician: Miguel Pizano MD  [facility] ENT  [address] [address]  Reason for Consultation   Dysphonia  History   HISTORY OF PRESENT ILLNESS: Mr. Odom is a 61 year old male who presents to us today with dysphonia.      he presents today for evaluation. he reports:    Dysphonia  - noticed hoarse voice 3-4 years ago unknown cause  - voice becomes tight and cuts off intermittently  - able to get short phrases out  - worse when breathing cold air in  - has been seen at other places and was told things look healthy structurally  - Dr. 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  - avoiding voice due to effort increasing  - worsens a little throughout day  - gets a bit better after a break  - friends are noticing his voice changes  - laughing is normal voice  - speaking in high voice does not make it easier  - dry throat in the morning  - snores at night  - no surgeries in the neck  - no strain of neck muscles in the day  - never tried voice therapy  - no family history of voice problems  - average voice demand  - patient is retired    Dysphagia  - denies    Dyspnea  - denies    Throat clearing/cough  - clearing throat to try to help his voice improve    GERD/LPRD   - denies    PAST MEDICAL HISTORY:   Past Medical History:   Diagnosis  Date     Atrial fibrillation (H)      Chronic maxillary sinusitis      Hyperlipidemia      Right knee pain      Snoring        PAST SURGICAL HISTORY: No past surgical history on file.    CURRENT MEDICATIONS:   Current Outpatient Medications:      ibuprofen (ADVIL) 200 MG capsule, Take 200 mg by mouth every 4 hours as needed for fever, Disp: , Rfl:     ALLERGIES: Penicillins    SOCIAL HISTORY:    Social History     Socioeconomic History     Marital status:      Spouse name: Not on file     Number of children: Not on file     Years of education: Not on file     Highest education level: Not on file   Occupational History     Not on file   Tobacco Use     Smoking status: Never Smoker     Smokeless tobacco: Former User   Substance and Sexual Activity     Alcohol use: Yes     Comment: a couple beers on weekends     Drug use: Not on file     Sexual activity: Not on file   Other Topics Concern     Not on file   Social History Narrative     Not on file     Social Determinants of Health     Financial Resource Strain: Not on file   Food Insecurity: Not on file   Transportation Needs: Not on file   Physical Activity: Not on file   Stress: Not on file   Social Connections: Not on file   Intimate Partner Violence: Not on file   Housing Stability: Not on file         FAMILY HISTORY:   Family History   Problem Relation Age of Onset     No Known Problems Mother      Hypertension Father      Obesity Father      No Known Problems Sister      No Known Problems Sister      No Known Problems Sister      No Known Problems Sister      Non-contributory for problems with anesthesia    REVIEW OF SYSTEMS:   The patient was asked a 14 point review of systems regarding constitutional symptoms, eye symptoms, ears, nose, mouth, throat symptoms, cardiovascular symptoms, respiratory symptoms, gastrointestinal symptoms, genitourinary symptoms, musculoskeletal symptoms, integumentary symptoms, neurological symptoms, psychiatric symptoms,  endocrine symptoms, hematologic/lymphatic symptoms, and allergic/ immunologic symptoms.   The pertinent factors have been included in the HPI and below.  Patient Supplied Answers to Review of Systems   ENT ROS 6/8/2022   Ears, Nose, Throat: Hearing loss, Ringing/noise in ears       Physical Examination   The patient underwent a physical examination as described below. The pertinent positive and negative findings are summarized after the description of the examination.  Constitutional: The patient's developmental and nutritional status was assessed. The patient's voice quality was assessed.  Head and Face: The head and face were inspected for deformities. The sinuses were palpated. The salivary glands were palpated. Facial muscle strength was assessed bilaterally.  Eyes: Extraocular movements and primary gaze alignment were assessed.  Ears, Nose, Mouth and Throat: The ears and nose were examined for deformities. The nasal septum, mucosa, and turbinates were inspected by anterior rhinoscopy. The lips, teeth, and gums were examined for abnormalities. The oral mucosa, tongue, palate, tonsils, lateral and posterior pharynx were inspected for the presence of asymmetry or mucosal lesions.    Neck: The tracheal position was noted, and the neck mass palpated to determine if there were any asymmetries, abnormal neck masses, thyromegally, or thyroid nodules.  Respiratory: The nature of the breathing and chest expansion/symmetry was observed.  Cardiovascular: The patient was examined to determine the presence of any edema or jugular venous distension.  Abdomen: The contour of the abdomen was noted.  Lymphatic: The patient was examined for infraclavicular lymphadenopathy.  Musculoskeletal: The patient was inspected for the presence of skeletal deformities.  Extremities: The extremities were examined for any clubbing or cyanosis.  Skin: The skin was examined for inflammatory or neoplastic conditions.  Neurologic: The patient's  orientation, mood, and affect were noted. The cranial nerve  functions were examined.  Other pertinent positive and negative findings on physical examination:      OC/OP: no lesions, uvula midline, soft palate elevates symmetrically, palatal tremor, FOM/BOT soft  Neck: no lesions, no TH tenderness to palpation    All other physical examination findings were within normal limits and noncontributory.  Procedures   Video Laryngoscopy with Stroboscopy (CPT 63783) and Behavioral & Qualitative Evaluation of Voice and Resonence     Preoperative Diagnosis:  Dysphonia and throat symptoms  Postoperative Diagnosis: Dysphonia and throat symptoms  Indication:  Patient has new or persistent dysphonia and throat symptoms.  This requires evaluation by stroboscopy to fully delineate the laryngeal functioning; especially mucosal wave assessment, ultrasharp visualization of lesions on the vocal folds, and overall functioning of the larynx.  Details of Procedure: A 70 degree rigid telescopic laryngoscope or a distal chip flexible scope was used. The lighting was obtained via a light cable connected to a stroboscopic unit. The telescope was inserted either transorally or transnasally until the vocal folds could be visualized. The patient was instructed to sustain the vowel  ee  at a comfortable pitch and loudness as the voice was monitored by a microphone connected to a fundamental frequency tracker. This circuit tracked vocal periodicity, allowing the light to flash in synchrony with the glottal cycles. A setting on the stroboscope was set to change the phase of light strobing with relation to the vocal fundamental frequency, producing an image of 1 to 2 glottal cycles every second. The video images were recorded for analysis. Use of the variable speed, slow and stop scan allowed careful study of pertinent segments of laryngeal function to increase accuracy of clinical assessments of function and dysfunction.  In particular, features  of glottal closure, mucosal wave on the vocal fold cover and laryngeal symmetry were analyzed. Lastly, the patient was asked to phonate speech samples and auditory/perceptual evaluation of voice and resonance were performed.  The vocal quality was carefully evaluated for hoarseness, breathiness, loudness, phrase length and intelligibility to determine the source of dysphonia.    Findings:   B. LARYNGOVIDEOSTROBOSCOPY   Anatomic/Physiological Deviations:  LNC, supraglottic hyperfunction  Mucosal wave: Right:  No restriction     Left: No restriction  Bilateral Vocal Fold Vibration: Symmetric  Vocal Process: Right: No restriction    Left:  No restriction  Vocal Fold closure: Complete glottal closure    Complication(s): None  Disposition: Patient tolerated the procedure well           Fiberoptic Endoscopic Evaluation of Swallowing (CPT 67182)  and Interpretation of Swallowing (CPT 24485)    Indications: See above notes for complete history and physical. Patient complains of dysphagia and/or there is suggestion on history and endoscopic exam of the presence of dysphagia causing medical complaints.  Swallowing evaluation is being performed to assess the presence and degree of dysphagia, and to recommend a safe diet.     Pulmonary Status:  No PNA   Current Diet:              regular                                        thin liquids      Consistency Amounts:  Thin Liquid: sip   Puree: sip  Solid: cookies            Positioning: upright in a chair  Oral Peripheral Exam: See physical exam section.  Anatomic Notes: See Videostroboscopy report for assessment of anatomy and laryngeal functioning  Pharyngeal secretions prior to administration of liquid or food: No  Oral Phase Abnormal Findings: No abnormal behavior observed  Behavioral Adaptations: No abnormal behavior observed  Pharyngeal Phase Abnormal Findings: no penetration, no aspiration and no residue    Recommended Diet:  regular                                         "thin liquids              Review of Relevant Clinical Data   I personally reviewed:  Notes: 3/23/22 Miguel Pizano DO  He notes his voice is more hoarse, isn't \"working right,\" and he cannot hit the notes when singing that he used to be able to. His voice is worse when he has a cold. He states he was previously scoped in [place], told everything was normal, and was advised to drink more water at night. He denies a prior history of surgery on his vocal cords, neck, chest or heart surgery, or recent intubation. His sister has acid reflux. Chad takes Tums occasionally. He states his voice is better in the morning. He gets up some phlegm when he clears his throat. He retired and no longer uses his voice a lot for work like he used to. He denies problems with choking on food or inability to eat resulting in weight loss. He denies tremors elsewhere in his body.  Labs:  No results found for: TSH  Lab Results   Component Value Date    .0 11/20/2018    BUN 14.0 11/20/2018     No results found for: WBC, HGB, HCT, MCV, PLT  No results found for: PT, PTT, INR  No results found for: DERIK  No components found for: RHEUMATOIDFACTOR,  RF  No results found for: CRP  No components found for: CKTOT, URICACID  No components found for: C3, C4, DSDNAAB, NDNAABIFA  No results found for: MPOAB    Patient reported Quality of Life (QOL) Measures   Patient Supplied Answers To VHI Questionnaire  Voice Handicap Index (VHI-10) 6/8/2022   My voice makes it difficult for people to hear me 2   People have difficulty understanding me in a noisy room 2   My voice difficulties restrict my personal and social life.  2   I feel left out of conversations because of my voice 0   My voice problem causes me to lose income 0   I feel as though I have to strain to produce voice 2   The clarity of my voice is unpredictable 2   My voice problem upsets me 2   My voice makes me feel handicapped 0   People ask, \"What's wrong with your voice?\" 2 "   VHI-10 14         Patient Supplied Answers To EAT Questionnaire  Eating Assessment Tool (EAT-10) 2022   My swallowing problem has caused me to lose weight 0   My swallowing problem interferes with my ability to go out for meals 0   Swallowing liquids takes extra effort 0   Swallowing solids takes extra effort 0   Swallowing pills takes extra effort 0   Swallowing is painful 0   The pleasure of eating is affected by my swallowing 0   When I swallow food sticks in my throat 0   I cough when I eat 0   Swallowing is stressful 0   EAT-10 0         Patient Supplied Answers To CSI Questionnaire  Cough Severity Index (CSI) 2022   My cough is worse when I lie down 0   My coughing problem causes me to restrict my personal and social life 0   I tend to avoid places because of my cough problem 0   I feel embarrassed because of my coughing problem 0   People ask, ''What's wrong?'' because I cough a lot 0   I run out of air when I cough 0   My coughing problem affects my voice 0   My coughing problem limits my physical activity 0   My coughing problem upsets me 0   People ask me if I am sick because I cough a lot 0   CSI Score 0         Patient Supplied Answers to Dyspnea Index Questionnaire:  No flowsheet data found.    Impression & Plan     IMPRESSION: Mr. Odom is a 61 year old male who is being seen for the followin. Dysphonia  - noticed hoarse voice 3-4 years ago unknown cause  - voice becomes tight and cuts off intermittently  - able to get short phrases out  - worse when breathing cold air in  - has been seen at other places and was told things look healthy structurally  - Dr. 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 All documentation has been reviewed by the aforementioned provider prior to being entered into the official medical record.     Scribe Preparation Attestation:  Gilda MEJIA, a scribe, prepared the chart for today's encounter.     Thank you for the kind referral and for allowing me to share in the care of Mr. Odom. If you have any questions, please do not hesitate to contact me.    Kathi Christianson MD    Laryngology    [facility] Voice [facility]  Department of  Otolaryngology - Head and Neck Surgery  Clinics & Surgery Center  [address] [place] [address]  Appointment line: [phone]  Fax: [phone]  [email or web address]

## 2022-06-14 NOTE — PATIENT INSTRUCTIONS
National spasmodic dysphonia association:  https://dysphonia.[email or web address]      If you have any questions you may reach out to me at [email or web address] once some level of normalcy has returned you may reach me via my office phone number is [phone].

## 2022-06-14 NOTE — PATIENT INSTRUCTIONS
You were seen in the ENT Clinic today by Dr. Christianson. If you have any questions or concerns after your appointment, please contact us (see below)      2.   Voice therapy    3   Please return to clinic one month after last voice therapy session        How to Contact Us:  Send a [employer] message to your provider. Our team will respond to you via [employer]. Occasionally, we will need to call you to get further information.  For urgent matters (Monday-Friday), call the ENT Clinic: [phone] and speak with a call center team member - they will route your call appropriately.   If you'd like to speak directly with a nurse, please find our contact information below. We do our best to check voicemail frequently throughout the day, and will work to call you back within 1-2 days. For urgent matters, please use the general clinic phone numbers listed above.      MARIA DEL ROSARIO Arellano  ENT RN Care Coordinator      Johanna RINALDI LPN  Direct: [phone]

## 2022-06-14 NOTE — PROGRESS NOTES
"     Speech-Language Pathology Department   EVALUATION  [facility]  Clinical Swallow Evaluation with Endoscopic Guidance by MD    06/14/22 1300   General Information   Type Of Visit Initial   Start Of Care Date 06/14/22   Referring Physician Dr. Kathi Christianson   Orders Evaluate And Treat   Orders Comment Clinical Swallow Evaluation   Medical Diagnosis Dysphagia, unspecified   Precautions/limitations No Known Precautions/limitations   Hearing Functional in 1:1 setting   Pertinent History of Current Problem/OT: Additional Occupational Profile Info Dennys \"Chad\" Ilene is a 61 year old male with a PMH significant for Afib and dysphonia. Pt referred to laryngology for c/o spasmodic dysphonia. Pt seen today in conjunction with ENT clinic visit per MD request. MD requesting baseline swallow evaluation given consideration of Botox injections for spasmodic dysphonia. Pt denies difficulty swallowing including coughing/TC and feeling of stasis with PO intake. He currently takes regular textures/thin liquids.   Respiratory Status Room air   Prior Level Of Function Swallowing   Prior Level Of Function Comment Regular textures/thin liquids   General Observations Pt highly pleasant and cooperative throughout evaluation   Patient/family Goals To figure out what is wrong with his voice   Clinical Swallow Evaluation   Oral Musculature generally intact   Dentition present and adequate   Mucosal Quality adequate   Mandibular Strength and Mobility intact   Oral Labial Strength and Mobility WFL   Lingual Strength and Mobility WFL   Velar Elevation impaired  (slight palatal tremor)   Buccal Strength and Mobility intact   Laryngeal Function Throat clear;Swallow;Voicing initiated   Oral Musculature Comments Dysphonia, slight palatal tremor; otherwise WFL   Additional Documentation Yes   Clinical Swallow Eval: Thin Liquid Texture Trial   Mode of Presentation, Thin Liquids straw   Volume of " Liquid or Food Presented 3oz   Oral Phase of Swallow WFL   Pharyngeal Phase of Swallow intact   Diagnostic Statement PO trials evaluated under endoscopy completed by MD. No penetration/aspiration or significant residuals.   Clinical Swallow Eval: Puree Solid Texture Trial   Mode of Presentation, Puree spoon;fed by clinician   Volume of Puree Presented 3 tablespoons   Oral Phase, Puree WFL   Pharyngeal Phase, Puree intact   Diagnostic Statement PO trials evaluated under endoscopy completed by MD. No penetration/aspiration or significant residuals.   Clinical Swallow Eval: Regular (Solid)   Mode of Presentation self-fed   Volume Presented 1 Anel Doone   Oral Phase WFL   Pharyngeal Phase intact   Diagnostic Statement PO trials evaluated under endoscopy completed by MD. No penetration/aspiration or significant residuals.   Swallow Compensations   Swallow Compensations No compensations were used   Educational Assessment   Barriers to Learning No barriers   Swallow Eval: Clinical Impressions   Skilled Criteria for Therapy Intervention No problems identified which require skilled intervention   Dysphagia Outcome Severity Scale (PIERRE) Level 7 - PIERRE   Treatment Diagnosis Safe, functional oropharyngeal swallow   Diet texture recommendations Regular diet;Thin liquids (level 0)   Recommended Feeding/Eating Techniques alternate between small bites and sips of food/liquid;maintain upright posture during/after eating for 30 mins;small sips/bites   Demonstrates Need for Referral to Another Service other (see comments)  (voice SLP)   Anticipated Discharge Disposition home   Risks and Benefits of Treatment have been explained. Yes   Patient, family and/or staff in agreement with Plan of Care Yes   Clinical Impression Comments Pt presents today with a safe, functional oropharyngeal swallow response. Oral motor examination reveals dysphonia, slight palatal tremor; otherwise WFL. Pt assessed today with thin liquid, puree and solid  trials under endoscopy completed by MD. No penetration/aspiration. No significant residuals. Pt appropriate to continue with regular textures/thin liquids. Should pt undergo botoxo injection for spasmodic dysphonia in the future, there is no concern for baseline dysphagia. No further SLP services for swallowing are warranted at this time.   Total Session Time   SLP Eval: oral/pharyngeal swallow function, clinical minutes (00286) 15   Total Evaluation Time 15     Thank you for the referral of Dennys Odom. If you have any questions about this report, please contact me using the information below.     MAYI Carlisle (diana), MA, CCC-SLP   Speech Language Pathologist  [facility] Trained Vocologist   [facility]  Dept. of Otolaryngology  Department of Rehabilitation Services  [address] [place] [id number]  Email: [email or web address]   Pronouns: she/her/hers

## 2022-07-29 ENCOUNTER — PREP FOR PROCEDURE (OUTPATIENT)
Dept: OTOLARYNGOLOGY | Facility: CLINIC | Age: 62
End: 2022-07-29

## 2022-07-29 ENCOUNTER — VIRTUAL VISIT (OUTPATIENT)
Dept: OTOLARYNGOLOGY | Facility: CLINIC | Age: 62
End: 2022-07-29
Payer: COMMERCIAL

## 2022-07-29 DIAGNOSIS — J38.3 ADDUCTOR SPASMODIC DYSPHONIA: ICD-10-CM

## 2022-07-29 DIAGNOSIS — J38.3 ADDUCTOR SPASMODIC DYSPHONIA: Primary | ICD-10-CM

## 2022-07-29 DIAGNOSIS — R49.0 DYSPHONIA: Primary | ICD-10-CM

## 2022-07-29 PROCEDURE — 92507 TX SP LANG VOICE COMM INDIV: CPT | Mod: GN | Performed by: SPEECH-LANGUAGE PATHOLOGIST

## 2022-07-29 NOTE — PROGRESS NOTES
"Dennys Odom is a 61 year old male who is being evaluated via a billable video visit.      The patient has been notified and verbally consented to the following:     This video visit will be conducted between you and your provider.    Patient has opted to conduct today's video visit vs an in-person appointment, and is not able to attend due to possible exposure to COVID-19.      If during the course of the call the provider feels a video visit is not appropriate, you will not be charged for this service.    Call initiated at: 11:01  Type of Video Platform Used: MessageBunker  Location of provider: Residence  Location of patient: Residence    [facility] VOICE CLINIC  THERAPY NOTE (CPT 85732)    Patient: Dennys Odom  Date of Service: 7/29/2022  Referring physician: Dr. Christianson  Impressions from most recent evaluation by Robbin Oneal CCC-SLP on 6/14/2022:  \"Dennys Odom is presenting today with voice changes that began 3 to 4 years ago without inciting incident.  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 Laryngeal evaluation shows severe four-way constrictive supraglottic hyperfunction present across many vocal tasks but worsening in a spastic-like manner on voiced initial sounds.  Across numerous repetitions and variations in order patient consistently reported increased effort on voiced initial versus voiceless initial loaded stimuli.  These findings are consistent with perceptual evaluation which is dominated by strain with intermittent exacerbations on voiced sounds.  Patient was stimulable for improvement with focus on increasing respiratory flow but moments of strain are clearly still appreciated.\"    SUBJECTIVE:  Since the patient's last session, they report the following:     Overall symptoms are about the " same    OBJECTIVE:  PATIENT REPORTED MEASURES:  Patient Specific Goal Metrics:  Dysponia SLP Goals 7/29/2022   How would you rate your speaking voice quality, if 0 is worst voice quality, and 10 is best voice? 3   How much effort is it to speak, if 0 is no extra effort and 10 is maximum effort? 6   How much does your voice problem bother you? Quite a bit     *see end of note for standardized measures*    THERAPEUTIC ACTIVITIES    Counseling and Education:    Patient had many questions about spasmodic dysphonia, Botox injections, and therapeutic rationales for behavioral therapy.  These were discussed at some length    Semi-Occluded Vocal Tract (SOVT) exercises instructed to reduce laryngeal tension, promote vocal fold pliability, and coordinate respiration and phonation    Straw with water resistance was found to be most facilitating     Sustained phonation, and voice vs. voiceless productions used to promote easy voicing and raise awareness of laryngeal tension    Ascending and descending glides utilized to promote vocal fold pliability     Advanced to /w/ phoneme to promote forward locus of resonance     Instructed to use these exercises as a warm-up / cooldown, and to re-calibrate the voice throughout the day.    Greatly decreased strain the use of semiclosed vocal tract exercises    Exercises to improve respiratory phonatory coordination     Flow phonation stimuli    /ju/ glide words -good accuracy with minimal to moderate support    The use of tactile cueing was helpful    3 word phrases -fair accuracy with minimal to moderate support    Patient was able to apply these concepts to rote speech task (counting) with maximal support      A regimen for home practice was instructed.    I provided handouts of today's therapeutic activities to facilitate practice.    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 Chad had many excellent questions about his disorder as well as treatment options including Botox and behavioral speech therapy.  These were discussed at some length and he reported improved comfort with the plan moving forward.  He also stated that he would like to move forward with Botox injections in the not too distant future.  I suggested we have today's appointment in 1 other before potentially pursuing Botox injections in mid September pending availability with Dr. Christianson.  Flow phonation and increased respiratory drive for found be facilitating today with relation to decreased strain which bodes well for benefit from behavioral intervention.    PLAN: I will see Chad in approximately 4 weeks, at which point we will continue to advance optimizing respiratory phonatory coordination.   For practice goals see AVS.       TOTAL SERVICE TIME: 55 minutes  TREATMENT (30903)  NO CHARGE FACILITY FEE (38897)    Wyatt Oneal M.M., M.A., CCC-SLP  Speech-Language Pathologist  Certificate of Vocology  [phone]    *this report was created in part through the use of computerized dictation software, and though reviewed following completion, some typographic errors may persist.  If there is confusion regarding any of this notes contents, please contact me for clarification.*    Patient Supplied Answers To Last 2 VHI Questionnaires  Voice Handicap Index (VHI-10) 6/8/2022 7/28/2022   My voice makes it difficult for people to hear me 2 3   People have difficulty understanding me in a noisy room 2 3   My voice difficulties restrict my personal and social life.  2 3   I feel left out of conversations because of my voice 0 2   My voice problem causes me to lose income 0 0   I feel as though I have to strain to produce voice 2 3  "  The clarity of my voice is unpredictable 2 3   My voice problem upsets me 2 2   My voice makes me feel handicapped 0 2   People ask, \"What's wrong with your voice?\" 2 2   VHI-10 14 23        Patient Supplied Answers To Last 2 CSI Questionnaires  Cough Severity Index (CSI) 6/8/2022   My cough is worse when I lie down 0   My coughing problem causes me to restrict my personal and social life 0   I tend to avoid places because of my cough problem 0   I feel embarrassed because of my coughing problem 0   People ask, ''What's wrong?'' because I cough a lot 0   I run out of air when I cough 0   My coughing problem affects my voice 0   My coughing problem limits my physical activity 0   My coughing problem upsets me 0   People ask me if I am sick because I cough a lot 0   CSI Score 0        Patient Supplied Answers To Last 2 EAT Questionnaires  Eating Assessment Tool (EAT-10) 6/8/2022   My swallowing problem has caused me to lose weight 0   My swallowing problem interferes with my ability to go out for meals 0   Swallowing liquids takes extra effort 0   Swallowing solids takes extra effort 0   Swallowing pills takes extra effort 0   Swallowing is painful 0   The pleasure of eating is affected by my swallowing 0   When I swallow food sticks in my throat 0   I cough when I eat 0   Swallowing is stressful 0   EAT-10 0        Patient Supplied Answers to Dyspnea Index Questionnaire:  No flowsheet data found.    "

## 2022-07-29 NOTE — PATIENT INSTRUCTIONS
Tawanna Bonilla,    Here are the exercises from today's sessin. Stay in touch with any quesitons. Someone will reach out re: botox.    -Robbin        Vocal Hygiene - Taking Good Care of Your Larynx     You have been given this brochure because you have some kind of swelling or lesion on your vocal folds that you would like to resolve, or you are going to undergo a surgery on your vocal folds. We will help you train your voice to be adequate for your vocal needs.  However, this brochure tells you ways you can optimize the way you care for your vocal folds before you start voice therapy, and until you are completely healed.    Reduce the extent and impact of your voice use and vocal fold vibration    There are several general things you can do to reduce the impact to your vocal folds as they vibrate.  These include the following:      Reduce the time spent talking.  Reduce the volume of talking   Turn down the radio or TV when you're conversing.  Don't talk to someone who's not in the same room with you.  Avoid talking in the car.   Avoid talking in noisy restaurants, bars, sporting events, etc.   Avoid yelling, screaming, etc.  Avoid throat-clearing and coughing.  Avoid grunting, as in lifting weights.  Avoid making hard, explosive, or rough sounds with your voice.  Avoid whispering and whistling (Surprised? Both are high-impact behaviors.).  Breathe before you talk; stop to take breaths often, and use that air generously when speaking.    All this may seem very limiting, but it's only temporary, until your voice is better.  The goal is for you to be able to do everything you want with your voice.      Maintain a Pleasant Environment for your Vocal Folds    The larynx likes to be in an environment that is moist, cool, and non-toxic.  Therefore:    Don't smoke - anything.  Avoid smoke and other inhalants if they bother you.  Try to maintain adequate humidity, especially in your bedroom at night.    Maintain Good  Hydration    The mucosal covering of the vocal folds must be wet and slippery in order to vibrate optimally.  Therefore, good hydration is a high priority.  There are two kinds of hydration you should maintain: 1) systemic hydration, which is the entire body's internal hydration; and 2) topical, or surface hydration, which keeps the epithelial surface of the vocal folds slippery enough to vibrate.      Systemic Hydration    Drink enough fluids.  The exact amount is different for each individual, depending on metabolic needs (activities, health status, medications), dietary habits (amount of fruits and vegetables or other moist foods), physical activity, and extent of voice use.      A good rule of thumb is that your urine should be clear or very pale throughout the day, and you should not feel the need to clear your throat.  Drink more if your athletic endeavors or extent of voice use demands more.  But be reasonable. Too much water can be unhealthy, and too much in too short a time will not help you, either.    Just remember that it takes time for your body to process the water you drink, so think of systemic hydration as a long-term solution.  While it can be soothing to the back of your throat in the moment if not something that touches your vocal fold directly.  For short-term benefit try topical hydration as outlined below    Topical Hydration    Humidity and Steam - The only forms of topical hydration the truly affect the level of the larynx are humidity and steam.  This can give you a leg up for 30 to 60 minutes, but do not expect the effects to linger for much longer than that.    Humidity can be especially helpful overnight.  Just remember if you use a room humidifier to always keep it clean and follow the  screening guidelines.    Steam is useful if you are feeling dry, sticky, or especially fatigued in the moment.  You can start with a free and the chip method such as boiling a pot of water  "and pulling it off the stove, or sitting in the bathroom with the shower on hot.  Breathing through your mouth and gently hum on a comfortable pitch on the way out.  The vibration from gentle humming incorporate condensation into the secretions more effectively    To help the back of your throat, mouth, and base of tongue gargling can also be very helpful, and it is best if you use saline (1/4 teaspoon of kosher salt or purified sea salt, and 1/4 teaspoon of baking soda for 8 ounces of water).  You can do this on and off during your day, and is especially helpful if you find yourself clearing your throat, coughing, or just feel a sensation of irritation/soreness.    Lozenges can be helpful for stimulating saliva and helping you feel more lubricated, but be sure to avoid anything that has menthol as is active ingredient as this can have a long-term effect of drying out the tissues of her throat.  95% of cough drops in the world have menthol after active ingredient even if it is not the labeled flavor.  The only way to know is to check the back of the package for active ingredients.  The most common nonmenthol-based cough drops are Ludens wild jay, [employer], and a subbrand of Halls called [place]e \"Breezers\", but to be honest hard candy works just fine.    Treat your voice with special care if you have heavy vocal demand    If you use your voice extensively, you may need to do other things to keep your voice from fatiguing.  Regularly warm up your voice.  Vocal muscles need to be warmed up before extensive use, just as your legs need warming up before you go running. Warm-ups should start gentle and become more vigorous, but shouldn't be fatiguing. Try pairing vocal warm-ups with upper-body movement, such as shoulder rolls or backstretches.    Train your vocal muscles carefully and gradually. You should increase vocal use just as you work up to any physical activity.    Fatigued muscles need rest and gradual " return to activity. Muscles that frequently become fatigued need to be conditioned.    Some people use their voice extensively, but not every day.  These include [employer] choir singers and persons who give lectures.  If fatigue is a problem after heavy voice use, you may want to do vocal exercises every day, not just the days you use your voice extensively.    Cool-down can be important for many people.  After extensive voice use, do exercises that gradually reduce the volume and pitch range of your voice use, and return to your natural speaking pitch range.  You will learn these exercises in therapy.    It's not wise to try to avoid mucosal swelling by using medications such as aspirin or ibuprofen.  These products thin the blood, making you more susceptible to vocal fold hemorrhage (bursting a blood vessel).    Cup and Bubble Exercises      WHY:  Though these exercises seems (and feels) silly they are helpful for a number of reasons.  First, they make you use your air generously and consistently, helping you to coordinate your breath and your voice.  Second, they lengthen and narrow the vocal tract with the straw.  This narrowing (or semi-occlusion in scientific terms) creates back pressure in your throat which has been shown to help the vocal folds vibrate more easily and reduce how hard some of the other muscles are squeezing.    To practice breathing: Start off in a forward leaning position with your elbows on your knees.  Feel the expansion into your belly and ribcage as you inhale and gentle contraction in the same area as you exhale.  Let yourself sit back up and maintain that pattern.    HOW:    With Water - Fill the cup (or bottle) about 2 inches full of water. Blow bubbles through the straw while keeping your voice on. (kind of like making an  ooooo  sound through straw).Keep the water bubbling the whole time. That means your air is moving consistently.      Without Water - make sound through the straw  (like it's a kazoo).  Make sure you are using your air freely.  You can hold your hand in front of the straw to test, and you should be able to feel the air moving.    Or you can use an easy  ooo  sound (like in the word  hue ) tongue or lip bubble!    Feel how open and relaxed your throat feels when you practice these sounds. If the bubbling or air stops or it gets tight, don't sweat it.  Just breath, relax, and start again.  Across all the exercises make sure you are getting a nice low breath and feeling the steady inward motion of low abdominal muscles when making sound.    Practice 3-5 times a day for no more than 3 minutes, and whenever you feel fatigued.    Aren't sure if you are doing it right? Ask yourself these three questions:  Does it feel easy?  Are the bubbles and voice consistent?  Do you feel that forward buzz?      What sounds to make:    Start off with just bubbles to give yourself a point of comparison for how little you need to work in your throat    Single pitches - use any comfortable pitch and sustain the note for as long as it feels free and easy, and your lips or tongue are bubbling.      Sighs - glide from high to low like you are sitting down in a comfortable chair after a long day of work.  The goal on this one is the low pitch, so don't worry about starting too high. This is about the low note    [place] - Start on a medium pitch and glide up just a bit and back down.  The goal on this one is the high pitch. This is about the high note     U  Words    Beginning Middle End   you cube few   use huge cue   union fume new   usury mute pew   eulogy feud chew   usual cute lieu   unify mule view   unit pure hue   unicorn music mew   youth beauty skew   euphemism future you   ewer humid spew   eunuch fuse adieu   [place] humor debut   [place] pupil renew   quentin muñoz imbumichael   useful human review   uniform muse argue     Start with the End column. Take a little breath and flow through the word feeling  the buzz at your lips  Move on to three words in a row Beginning => middle => End  Now count aloud flowing from one word to the next, then  something to read finding the same.   As you go through your day, try to feel that flowing from word to word.  We'll work more on this next time.     Practice this 2-3 times per day, but not for very long.  Look to bring it in to your daily speech!

## 2022-08-01 ENCOUNTER — TELEPHONE (OUTPATIENT)
Dept: OTOLARYNGOLOGY | Facility: CLINIC | Age: 62
End: 2022-08-01

## 2022-08-01 PROBLEM — J38.3 ADDUCTOR SPASMODIC DYSPHONIA: Status: ACTIVE | Noted: 2022-08-01

## 2022-08-01 NOTE — TELEPHONE ENCOUNTER
ARMANI with request for call back to schedule 2 virtual appts (1 and 6 weeks following upcomming procedure) per Dr Christianson request.

## 2022-08-10 ENCOUNTER — VIRTUAL VISIT (OUTPATIENT)
Dept: OTOLARYNGOLOGY | Facility: CLINIC | Age: 62
End: 2022-08-10
Payer: COMMERCIAL

## 2022-08-10 DIAGNOSIS — R49.0 DYSPHONIA: Primary | ICD-10-CM

## 2022-08-10 DIAGNOSIS — J38.3 ADDUCTOR SPASMODIC DYSPHONIA: ICD-10-CM

## 2022-08-10 PROCEDURE — 92507 TX SP LANG VOICE COMM INDIV: CPT | Mod: GN | Performed by: SPEECH-LANGUAGE PATHOLOGIST

## 2022-08-10 NOTE — LETTER
"8/10/2022       RE: Dennys Odom  [address] [facility] [address]     Dear Colleague,    Thank you for referring your patient, Dennys Odom, to the [facility] VOICE CLINIC [place] at [facility]. Please see a copy of my visit note below.    Dennys Odom is a 61 year old male who is being evaluated via a billable video visit.      The patient has been notified and verbally consented to the following:     This video visit will be conducted between you and your provider.    Patient has opted to conduct today's video visit vs an in-person appointment, and is not able to attend due to possible exposure to COVID-19.      If during the course of the call the provider feels a video visit is not appropriate, you will not be charged for this service.    Call initiated at: 8:55  Type of Video Platform Used: [employer]  Location of provider: Residence  Location of patient: [facility] VOICE [facility]  THERAPY NOTE (CPT 91201)    Patient: Dennys Odom  Date of Service: 8/10/2022  Referring physician: Dr. Christianson  Impressions from most recent evaluation by Robbin Oneal CCC-SLP on 6/14/2022:  \"Dennys Odom is presenting today with voice changes that began 3 to 4 years ago without inciting incident.  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 Laryngeal evaluation shows severe four-way constrictive supraglottic hyperfunction present across many vocal tasks but worsening in a spastic-like manner on voiced initial sounds.  Across numerous repetitions and variations in order patient consistently reported increased effort on voiced initial versus voiceless initial loaded stimuli.  These findings are consistent with perceptual evaluation which " "is dominated by strain with intermittent exacerbations on voiced sounds.  Patient was stimulable for improvement with focus on increasing respiratory flow but moments of strain are clearly still appreciated.\"    SUBJECTIVE:  Since the patient's last session, they report the following:     Overall symptoms are improved    Finds that using the straw and the flow seem helpful    Like retraining his brain    Practicing mostly with SOVT in his truck    OBJECTIVE:  PATIENT REPORTED MEASURES:  Patient Specific Goal Metrics:  Dysponia SLP Goals 7/29/2022 8/10/2022   How would you rate your speaking voice quality, if 0 is worst voice quality, and 10 is best voice? 3 4   How much effort is it to speak, if 0 is no extra effort and 10 is maximum effort? 6 5   How much does your voice problem bother you? Quite a bit Somewhat     *see end of note for standardized measures*    THERAPEUTIC ACTIVITIES    Counseling and Education:    Education regarding Botox injections  o Potential side effects  o Timeline for improvement and general trajectory over time  o Value of using a form for tracking to help titrate dose    Exercises to improve respiratory phonatory coordination     Patient demonstrated previously assigned exercises    Good accuracy with semioccluded vocal tract exercises with minimal support    Awareness of excessive (inspiratory reserve) and insufficient (expiratory reserve) lung volumes    A counting task was utilized to promote patient recognition of increased respiratory engagement to counter recoil during exhalation, and habituate a low breath subsequent to this threshold, but before expiratory reserve volume    Patient reported improved awareness of these thresholds, and was able to operate within their boundaries with minimal clinician support    Concepts of optimal lung capacity were incorporated into the following exercises:    Recognition of optimal phrase length for decreased laryngeal strain      A regimen for " home practice was instructed.    I provided handouts of today's therapeutic activities to facilitate practice.    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 Chad feels that the therapeutic exercises provided at his initial appointment were quite helpful.  We continued that avenue of intervention focusing on respiratory phonatory coordination particularly as it relates to lung volume.  Given his tendency to practice while on the road between locations this was felt to be most easily utilized with low cognitive demand task such as counting.  Beyond direct therapeutic exercises a significant amount of time was spent in education regarding the logistics associated with Botox injections and patient questions were answered related to this.    PLAN: I will see Chad in approximately 3 weeks, at which point we will continue to advance phonatory respiratory coordination to decrease laryngeal control of airflow and potential laryngeal strain that may contribute to more salience of tremor  For practice goals see AVS.     TOTAL SERVICE TIME: 50 minutes  TREATMENT (69716)  NO CHARGE FACILITY FEE (11187)    Wyatt Oneal M.M., M.A., CCC-SLP  Speech-Language Pathologist  Certificate of Vocology  [phone]    *this report was created in part through the use of computerized dictation software, and though reviewed following completion, some typographic errors may persist.  If there is confusion regarding any of this notes contents, please contact me for clarification.*    Patient Supplied Answers To Last 2 VHI Questionnaires  Voice Handicap Index (VHI-10) 6/8/2022 7/28/2022   My voice makes it difficult for people to hear me 2 3   People have difficulty  "understanding me in a noisy room 2 3   My voice difficulties restrict my personal and social life.  2 3   I feel left out of conversations because of my voice 0 2   My voice problem causes me to lose income 0 0   I feel as though I have to strain to produce voice 2 3   The clarity of my voice is unpredictable 2 3   My voice problem upsets me 2 2   My voice makes me feel handicapped 0 2   People ask, \"What's wrong with your voice?\" 2 2   VHI-10 14 23       Again, thank you for allowing me to participate in the care of your patient.      Sincerely,    Robbin Oneal, SLP      "

## 2022-08-10 NOTE — PROGRESS NOTES
"Dennys Odom is a 61 year old male who is being evaluated via a billable video visit.      The patient has been notified and verbally consented to the following:     This video visit will be conducted between you and your provider.    Patient has opted to conduct today's video visit vs an in-person appointment, and is not able to attend due to possible exposure to COVID-19.      If during the course of the call the provider feels a video visit is not appropriate, you will not be charged for this service.    Call initiated at: 8:55  Type of Video Platform Used: [employer]  Location of provider: Residence  Location of patient: Residence    [facility] VOICE CLINIC  THERAPY NOTE (CPT 28440)    Patient: Dennys Odom  Date of Service: 8/10/2022  Referring physician: Dr. Christianson  Impressions from most recent evaluation by Robbin Oneal CCC-SLP on 6/14/2022:  \"Dennys Odom is presenting today with voice changes that began 3 to 4 years ago without inciting incident.  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 Laryngeal evaluation shows severe four-way constrictive supraglottic hyperfunction present across many vocal tasks but worsening in a spastic-like manner on voiced initial sounds.  Across numerous repetitions and variations in order patient consistently reported increased effort on voiced initial versus voiceless initial loaded stimuli.  These findings are consistent with perceptual evaluation which is dominated by strain with intermittent exacerbations on voiced sounds.  Patient was stimulable for improvement with focus on increasing respiratory flow but moments of strain are clearly still appreciated.\"    SUBJECTIVE:  Since the patient's last session, they report the following:     Overall symptoms are " improved    Finds that using the straw and the flow seem helpful    Like retraining his brain    Practicing mostly with SOVT in his truck    OBJECTIVE:  PATIENT REPORTED MEASURES:  Patient Specific Goal Metrics:  Dysponia SLP Goals 7/29/2022 8/10/2022   How would you rate your speaking voice quality, if 0 is worst voice quality, and 10 is best voice? 3 4   How much effort is it to speak, if 0 is no extra effort and 10 is maximum effort? 6 5   How much does your voice problem bother you? Quite a bit Somewhat     *see end of note for standardized measures*    THERAPEUTIC ACTIVITIES    Counseling and Education:    Education regarding Botox injections  o Potential side effects  o Timeline for improvement and general trajectory over time  o Value of using a form for tracking to help titrate dose    Exercises to improve respiratory phonatory coordination     Patient demonstrated previously assigned exercises    Good accuracy with semioccluded vocal tract exercises with minimal support    Awareness of excessive (inspiratory reserve) and insufficient (expiratory reserve) lung volumes    A counting task was utilized to promote patient recognition of increased respiratory engagement to counter recoil during exhalation, and habituate a low breath subsequent to this threshold, but before expiratory reserve volume    Patient reported improved awareness of these thresholds, and was able to operate within their boundaries with minimal clinician support    Concepts of optimal lung capacity were incorporated into the following exercises:    Recognition of optimal phrase length for decreased laryngeal strain      A regimen for home practice was instructed.    I provided handouts of today's therapeutic activities to facilitate practice.    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 Chad feels that the therapeutic exercises provided at his initial appointment were quite helpful.  We continued that avenue of intervention focusing on respiratory phonatory coordination particularly as it relates to lung volume.  Given his tendency to practice while on the road between locations this was felt to be most easily utilized with low cognitive demand task such as counting.  Beyond direct therapeutic exercises a significant amount of time was spent in education regarding the logistics associated with Botox injections and patient questions were answered related to this.    PLAN: I will see Chad in approximately 3 weeks, at which point we will continue to advance phonatory respiratory coordination to decrease laryngeal control of airflow and potential laryngeal strain that may contribute to more salience of tremor  For practice goals see AVS.     TOTAL SERVICE TIME: 50 minutes  TREATMENT (88096)  NO CHARGE FACILITY FEE (85742)    Wyatt Oneal M.M., M.A., CCC-SLP  Speech-Language Pathologist  Certificate of Vocology  [phone]    *this report was created in part through the use of computerized dictation software, and though reviewed following completion, some typographic errors may persist.  If there is confusion regarding any of this notes contents, please contact me for clarification.*    Patient Supplied Answers To Last 2 VHI Questionnaires  Voice Handicap Index (VHI-10) 6/8/2022 7/28/2022   My voice makes it difficult for people to hear me 2 3   People have difficulty understanding me in a noisy room 2 3   My voice difficulties restrict my personal and social life.  2 3   I feel left out of conversations because of my voice 0 2   My voice problem causes me to lose income 0 0   I feel as though I have to strain to produce voice 2 3   The clarity of my voice is unpredictable 2 3   My  "voice problem upsets me 2 2   My voice makes me feel handicapped 0 2   People ask, \"What's wrong with your voice?\" 2 2   VHI-10 14 23     "

## 2022-08-11 NOTE — PATIENT INSTRUCTIONS
Rony Bonilla,    It was good to see you today and I am glad that the strategies from last time were helpful.  I have attached the exercises we were doing today which focused on recognition of your lung volume relative to coordinating your breathing and your voice so that you do not wind up speaking to low in the tank.  I will also send you an email version of this message with the Botox tracking sheet that we were discussing.  I will plan to see you on the second, but if we get closer to time and you are feeling as if you would rather postpone until after the Botox injections that is totally fine.  The more advance notice you can provide the better, but I am more than happy to see you if you would like.    Stay in touch with any questions in the interim!    -Robbin    Exercises:    _____________________________________________________________________________________________  Respiratory Phonatory Coordination  Breathing for speech and singing should use the strong muscles of your abdominal wall and low rib cage to do the heavy lifting, taking the burden off of the small muscles surrounding your larynx.  These exercises are designed to help you find that low respiratory engagement, raise your awareness of optimal lung volumes for speech, and then make these patterns habit so that you can carry them over to your daily voice use.  These exercises draw heavily from the work of Martha Koo, an acting voice specialist and SLP.    STEP 1 - Finding low respiratory engagement  Lean forward in a chair with your elbows resting on your knees.  Exhale all your air out on a  sh  sound like you are gently shushing a baby.  Feel the gentle expansion in your belly and your rib cage along your lower back while inhale, and the gentle contraction in these same areas while you exhale.    Inhaling shouldn't be a dramatic event!  Aim for an easy / silent inhalation which you allow to just fall in rather than gasping or reaching for the air.  To  feel this sensation try the following:  Expel all of your air on a  sh  sound  With all of your air gone wait for a count of 3 more more  When you can't wait any longer simply release the muscles you are holding to keep the air out  It won't be a huge breath, but you will feel the air simply fall in.  Repeat this two more times to get accustomed to allowing rather than forcing your air in.  Now return to easy breathing in and out (without the need to wait after the exhale) trying to maintain the sensation of that easy silent inhalation  STEP 2 - Awareness of lung capacity  Ensuring that you take adequate breath before you start to speak or sing allows you to use the recoil forces of your rib-cage and abdominal wall to your benefit, while speaking too low on lung volume makes these forces work against you.  These exercises are designed to make you aware of these lung volumes so that you can use your air to best effect.  Recognize the upper and lower limits  Inhale as deeply as you possibly can.   Exhale as deeply as you possibly can.   Repeat 2 times paying attention to the amount of effort it takes, and any physical tension in your neck, shoulders, abdomen, and rib-cage.  Doing this you'll begin to recognize that exhaling or inhaling to our maximum capacity can actually create MORE tension rather than less.  For best voice use you want inhale only to comfortably full lung volume.  For exhalation the next exercise will help you recognize when your lung volume is too low to best support your voice.  Learning the landmarks for exhalation  Take a comfortably full breath (easy and silent as we practiced above)  In a chant-like voice connecting one word to the next count as high as you can on a single breath.  Pay special attention to the following:  Initially this is easy to do as the recoil forces from your ribcage and abdomen are adding to your respiratory drive  At some point toward the middle you will recognize that  you have started to work a tiny bit harder to keep voice going.  This is the mid-way point!  Toward the end you will have to work VERY hard to get your voice out, and there will be a notable decrease in voice quality.  This is the point of no return!  Repeat this 2-4 times recognizing those landmarks.   Ultimately, to be efficient with your voicing you will want to breathe after the mid-way point but before the point of no return, but you need to be aware of these sensations to make this change.    STEP 3 - Putting it all together  On these exercises, do your best to integrate steps one and two.  Maximum functional phrase length.   Take a comfortably full breath.    In a chant-like voice connecting one word to next begin counting.    Once you have passed the mid-way point but before you reach the point of no return allow yourself to take a low easy breath refilling your lung volume.  Take as much time for this as you need.  Continue this pattern counting up to the number 50.  Set phrase lengths:   For each of these you will use the same chant-like counting. Instead of taking the comfortably full breath from before, just take enough air to reach the goal number (5 for groups of 5, 2 for groups of 2) without feeling like you have TOO much left over, or reaching the point of no return. After doing several sets in a row you will find that balance point  Groups of 5 counting up to 25  Groups of 4 counting  up to 20  Groups of 3 counting up to 15  Groups of 2 counting up to 10  Groups of 1 counting up to 5  Varied phrase lengths:  Like the previous exercises you will use the chantlike flowing counting.  But this time instead of having set phrase lengths YOU are in control of how small or large the phrase length is.  For example the first breath may take you to 15, the second only to 18, the third to 25, the fourth to 31, etc.  Reading passages:   a book, newspaper, poem, or just read your facebook newsfeed.  As you  are reading think about taking the right amount of breath for the coming sentence or phrase.  If at any point you have passed the mid-way point, give yourself permission to fill the tank back up before you reach the point of no return.  Keep the words connected to one another, to promote that sense of flow.  Feel free to make it chant-like at first, but with each repetition, let it be more like natural speech while keeping that flow and connection. Practice for 3 minutes then evaluate how things went.  Conversation:  Let yourself have a casual conversation with a friend or family member.  Make sure it's a light enough topic that you can let your attention drift to your breathing intermittently.  Check to make sure that you are allowing yourself to take that best low engaged comfortable breath, and that you are refilling regularly to avoid the point of no return.  Try to feel the same sense of airflow  throughout your speech, even if the chantlike aspect has been dialed down.

## 2022-09-27 ENCOUNTER — HOSPITAL ENCOUNTER (OUTPATIENT)
Facility: AMBULATORY SURGERY CENTER | Age: 62
Discharge: HOME | End: 2022-09-27
Attending: OTOLARYNGOLOGY | Admitting: OTOLARYNGOLOGY
Payer: COMMERCIAL

## 2022-09-27 VITALS
OXYGEN SATURATION: 100 % | TEMPERATURE: 97.8 F | SYSTOLIC BLOOD PRESSURE: 160 MMHG | BODY MASS INDEX: 28.44 KG/M2 | RESPIRATION RATE: 16 BRPM | HEART RATE: 56 BPM | DIASTOLIC BLOOD PRESSURE: 93 MMHG | HEIGHT: 72 IN | WEIGHT: 210 LBS

## 2022-09-27 DIAGNOSIS — J38.3 ADDUCTOR SPASMODIC DYSPHONIA: ICD-10-CM

## 2022-09-27 PROCEDURE — 64617 CHEMODENER MUSCLE LARYNX EMG: CPT | Mod: 50

## 2022-09-27 PROCEDURE — 64617 CHEMODENER MUSCLE LARYNX EMG: CPT | Mod: 50 | Performed by: OTOLARYNGOLOGY

## 2022-09-27 RX ORDER — LIDOCAINE HYDROCHLORIDE 40 MG/ML
SOLUTION TOPICAL PRN
Status: DISCONTINUED | OUTPATIENT
Start: 2022-09-27 | End: 2022-09-27 | Stop reason: HOSPADM

## 2022-09-27 NOTE — OP NOTE
Operative Note   Otolaryngology - Head and Neck Surgery       DATE OF OPERATION:   September 27, 2022    PREOPERATIVE DIAGNOSIS:   Adductor Spasmodic Dysphonia       POSTOPERATIVE DIAGNOSIS:   Same    NAME OF OPERATION:   Laryngoscopy with chemodenervation of the larynx  with botulinum toxin    ANESTHESIA  Type: local    SURGEON:   Kathi Christianson MD   INDICATIONS FOR PROCEDURE:   The patient is a 61 year old male with hyperfunctional larynx. Botox injections at regular intervals are routine therapy for hyperfunctional larynx such as spasmodic dysphonia, laryngospasm, chronic cough, paradoxical vocal fold motion, laryngeal synkinesis.The risks, benefits and alternatives of the surgery were discussed. The patient wishes to proceed with surgery and has signed an informed consent.     FINDINGS:     1.0 units of Botox into the Each thyroarytenoid muscle via flexible injection        DESCRIPTION OF PROCEDURE:   The patient was brought into the operating room and  seated upright in the chair. Topical anesthesia was achieved by spraying 4% topical lidocaine directly onto the vocal folds through a working channel in the endoscope.  After adequate anesthesia was achieved, a flexible needle was used to inject directly the material into the thyroartyenoid muscle under visualization. Having completed this the operation was completed and the scope withdrawn atraumatically.     COMPLICATIONS:   None.  .   ESTIMATED BLOOD LOSS:   Less than 10cc    DISPOSITION:   PACU.    SPECIMENS:  * No specimens in log *

## 2022-10-04 ENCOUNTER — VIRTUAL VISIT (OUTPATIENT)
Dept: OTOLARYNGOLOGY | Facility: CLINIC | Age: 62
End: 2022-10-04
Payer: COMMERCIAL

## 2022-10-04 VITALS — BODY MASS INDEX: 29.4 KG/M2 | HEIGHT: 71 IN | WEIGHT: 210 LBS

## 2022-10-04 DIAGNOSIS — J38.3 ADDUCTOR SPASMODIC DYSPHONIA: Primary | ICD-10-CM

## 2022-10-04 PROCEDURE — 99024 POSTOP FOLLOW-UP VISIT: CPT | Mod: 95 | Performed by: OTOLARYNGOLOGY

## 2022-10-04 ASSESSMENT — PAIN SCALES - GENERAL: PAINLEVEL: NO PAIN (0)

## 2022-10-04 NOTE — PATIENT INSTRUCTIONS
"You were seen in the clinic today by Dr. Christianson. If you have any questions or concerns after your appointment, please call the clinic at [phone]. Press \"1\" for scheduling, press \"3\" for nurse advice.    2.   Plan for return visit on 11/8/2022 at 10:00a.    3.   Recommendations based on today's visit:         - small sips of liquids only        - keep log     Mina Malik, RN, RNCC  [facility]  Department of Otolaryngology  [phone]    "

## 2022-10-04 NOTE — PROGRESS NOTES
[facility]   at the [facility]   Otolaryngology Clinic     Patient: Dennys Odom    MRN: [id number]    : 1960    Age/Gender: 61 year old male  Date of Service: 10/4/2022  Rendering Provider:   Kathi Christianson MD      PAST MEDICAL HISTORY:   Past Medical History:   Diagnosis Date     Atrial fibrillation (H)      Chronic maxillary sinusitis      Hyperlipidemia      Right knee pain      Snoring        PAST SURGICAL HISTORY:   Past Surgical History:   Procedure Laterality Date     LARYNGOSCOPY, FLEXIBLE WITH INJECTION N/A 2022    Procedure: LARYNGOSCOPY, FLEXIBLE WITH INJECTION OF BOTOX;  Surgeon: Kathi Christianson MD;  Location: [facility] OR       CURRENT MEDICATIONS:   Current Outpatient Medications:      ibuprofen (ADVIL) 200 MG capsule, Take 200 mg by mouth every 4 hours as needed for fever, Disp: , Rfl:     ALLERGIES: Penicillins    SOCIAL HISTORY:    Social History     Socioeconomic History     Marital status:      Spouse name: Not on file     Number of children: Not on file     Years of education: Not on file     Highest education level: Not on file   Occupational History     Not on file   Tobacco Use     Smoking status: Never Smoker     Smokeless tobacco: Former User   Substance and Sexual Activity     Alcohol use: Yes     Comment: a couple beers on weekends     Drug use: Not on file     Sexual activity: Not on file   Other Topics Concern     Not on file   Social History Narrative     Not on file     Social Determinants of Health     Financial Resource Strain: Not on file   Food Insecurity: Not on file   Transportation Needs: Not on file   Physical Activity: Not on file   Stress: Not on file   Social Connections: Not on file   Intimate Partner Violence: Not on file   Housing Stability: Not on file         FAMILY HISTORY:   Family History   Problem Relation Age of Onset     No Known Problems Mother      Hypertension Father      Obesity Father      No Known Problems  "Sister      No Known Problems Sister      No Known Problems Sister      No Known Problems Sister       Non-contributory for problems with anesthesia    REVIEW OF SYSTEMS:   The patient was asked a 14 point review of systems regarding constitutional symptoms, eye symptoms, ears, nose, mouth, throat symptoms, cardiovascular symptoms, respiratory symptoms, gastrointestinal symptoms, genitourinary symptoms, musculoskeletal symptoms, integumentary symptoms, neurological symptoms, psychiatric symptoms, endocrine symptoms, hematologic/lymphatic symptoms, and allergic/ immunologic symptoms.   The pertinent factors have been included in the HPI and below.  Patient Supplied Answers to Review of Systems  [facility] ROS 10/2/2022   Ears, Nose, Throat: Hearing loss, Ringing/noise in ears       Physical Examination   Deferred due to video visit    Review of Relevant Clinical Data   I personally reviewed:  Labs:  No results found for: TSH  Lab Results   Component Value Date    .0 11/20/2018    BUN 14.0 11/20/2018     No results found for: WBC, HGB, HCT, MCV, PLT  No results found for: PT, PTT, INR  No results found for: DERIK  No components found for: RHEUMATOIDFACTOR,  RF  No results found for: CRP  No components found for: CKTOT, URICACID  No components found for: C3, C4, DSDNAAB, NDNAABIFA  No results found for: MPOAB    Patient reported Quality of Life (QOL) Measures   Patient Supplied Answers To VHI Questionnaire  Voice Handicap Index (VHI-10) 7/28/2022   My voice makes it difficult for people to hear me 3   People have difficulty understanding me in a noisy room 3   My voice difficulties restrict my personal and social life.  3   I feel left out of conversations because of my voice 2   My voice problem causes me to lose income 0   I feel as though I have to strain to produce voice 3   The clarity of my voice is unpredictable 3   My voice problem upsets me 2   My voice makes me feel handicapped 2   People ask, \"What's wrong " "with your voice?\" 2   VHI-10 23         Patient Supplied Answers To EAT Questionnaire  Eating Assessment Tool (EAT-10) 6/8/2022   My swallowing problem has caused me to lose weight 0   My swallowing problem interferes with my ability to go out for meals 0   Swallowing liquids takes extra effort 0   Swallowing solids takes extra effort 0   Swallowing pills takes extra effort 0   Swallowing is painful 0   The pleasure of eating is affected by my swallowing 0   When I swallow food sticks in my throat 0   I cough when I eat 0   Swallowing is stressful 0   EAT-10 0         Patient Supplied Answers To CSI Questionnaire  Cough Severity Index (CSI) 6/8/2022   My cough is worse when I lie down 0   My coughing problem causes me to restrict my personal and social life 0   I tend to avoid places because of my cough problem 0   I feel embarrassed because of my coughing problem 0   People ask, ''What's wrong?'' because I cough a lot 0   I run out of air when I cough 0   My coughing problem affects my voice 0   My coughing problem limits my physical activity 0   My coughing problem upsets me 0   People ask me if I am sick because I cough a lot 0   CSI Score 0         Patient Supplied Answers to Dyspnea Index Questionnaire:  No flowsheet data found.    Impression & Plan     IMPRESSION: Mr. Odom is a 61 year old male who is being seen for the following:      Kathi Christianson MD    Laryngology    [facility]  Department of  Otolaryngology - Head and Neck Surgery  Clinics & Surgery Center  [address] [place] [address]  Appointment line: [phone]  Fax: [phone]  [email or web address]     Scribe Disclosure:  I, Suma Rubin, am serving as a scribe to document services personally performed by Kathi Christianson MD at this visit, based upon the provider's statements to me. All documentation has been reviewed by the aforementioned provider prior to " being entered into the official medical record.

## 2022-10-04 NOTE — PROGRESS NOTES
Chad is a 61 year old who is being evaluated via a billable video visit.      How would you like to obtain your AVS? MyChart  If the video visit is dropped, the invitation should be resent by: Text to cell phone: [phone]  Will anyone else be joining your video visit? No        Video-Visit Details    Video Start Time: 9:40    Type of service:  Video Visit    Video End Time:9:48    Originating Location (pt. Location): Home    Distant Location (provider location):  [facility] [place]     Platform used for Video Visit: [facility]   at the [facility]   Otolaryngology Clinic     Patient: Dennys Odom    MRN: [id number]    : 1960    Age/Gender: 61 year old male  Date of Service: 10/4/2022  Rendering Provider:   Kathi Christianson MD     Chief Complaint   Dysphonia  S/p TA botox injection 22  Interval History   HISTORY OF PRESENT ILLNESS: Mr. Odom is a 61 year old male is being followed for dysphonia. He was initially seen on 22. Please refer to this note for full history.     Today, he presents for follow up with his wife. He reports:  - talking more  - voice is changed, more airy/hoarse  - volume is less than before   - started talking more on 10/1  - less self-conscious about talking  - when drinks too fast or takes carbonated drink, he has difficulty swallowing    PAST MEDICAL HISTORY:   Past Medical History:   Diagnosis Date     Atrial fibrillation (H)      Chronic maxillary sinusitis      Hyperlipidemia      Right knee pain      Snoring        PAST SURGICAL HISTORY:   Past Surgical History:   Procedure Laterality Date     LARYNGOSCOPY, FLEXIBLE WITH INJECTION N/A 2022    Procedure: LARYNGOSCOPY, FLEXIBLE WITH INJECTION OF BOTOX;  Surgeon: Kathi Christianson MD;  Location: [facility] OR       CURRENT MEDICATIONS:   Current Outpatient Medications:      ibuprofen (ADVIL) 200 MG capsule, Take 200 mg by mouth every 4 hours  as needed for fever, Disp: , Rfl:     ALLERGIES: Penicillins    SOCIAL HISTORY:    Social History     Socioeconomic History     Marital status:      Spouse name: Not on file     Number of children: Not on file     Years of education: Not on file     Highest education level: Not on file   Occupational History     Not on file   Tobacco Use     Smoking status: Never Smoker     Smokeless tobacco: Former User   Substance and Sexual Activity     Alcohol use: Yes     Comment: a couple beers on weekends     Drug use: Not on file     Sexual activity: Not on file   Other Topics Concern     Not on file   Social History Narrative     Not on file     Social Determinants of Health     Financial Resource Strain: Not on file   Food Insecurity: Not on file   Transportation Needs: Not on file   Physical Activity: Not on file   Stress: Not on file   Social Connections: Not on file   Intimate Partner Violence: Not on file   Housing Stability: Not on file         FAMILY HISTORY:   Family History   Problem Relation Age of Onset     No Known Problems Mother      Hypertension Father      Obesity Father      No Known Problems Sister      No Known Problems Sister      No Known Problems Sister      No Known Problems Sister       Non-contributory for problems with anesthesia    REVIEW OF SYSTEMS:   The patient was asked a 14 point review of systems regarding constitutional symptoms, eye symptoms, ears, nose, mouth, throat symptoms, cardiovascular symptoms, respiratory symptoms, gastrointestinal symptoms, genitourinary symptoms, musculoskeletal symptoms, integumentary symptoms, neurological symptoms, psychiatric symptoms, endocrine symptoms, hematologic/lymphatic symptoms, and allergic/ immunologic symptoms.   The pertinent factors have been included in the HPI and below.  Patient Supplied Answers to Review of Systems   ENT ROS 10/4/2022   Ears, Nose, Throat: Hearing loss, Ringing/noise in ears       Physical Examination   The patient  underwent a physical examination as described below. The pertinent positive and negative findings are summarized after the description of the examination.  Constitutional: The patient's developmental and nutritional status was assessed. The patient's voice quality was assessed.  Head and Face: The head and face were inspected for deformities. Facial muscle strength was assessed bilaterally.  Eyes: Extraocular movements and primary gaze alignment were assessed.  Ears, Nose, Mouth and Throat: The ears and nose were examined for deformities.The lips were examined for abnormalities.   Neck: The neck was visualized for abnormal neck masses  Respiratory: The nature of the breathing was observed.  Extremities: The hand extremities were examined for any clubbing or cyanosis.  Skin: The skin was examined for inflammatory or neoplastic conditions.  Neurologic: The patient's orientation, mood, and affect were noted. The cranial nerve  functions were examined.  Other pertinent positive and negative findings on physical examination:   Breathing comfortably on room air, no stridor with deep inspiration  no throat clearing throughout the visit     All other physical examination findings were within normal limits and noncontributory    Review of Relevant Clinical Data   I personally reviewed:    Labs:  No results found for: TSH  Lab Results   Component Value Date    .0 11/20/2018    BUN 14.0 11/20/2018     No results found for: WBC, HGB, HCT, MCV, PLT  No results found for: PT, PTT, INR  No results found for: DERIK  No components found for: RHEUMATOIDFACTOR,  RF  No results found for: CRP  No components found for: CKTOT, URICACID  No components found for: C3, C4, DSDNAAB, NDNAABIFA  No results found for: MPOAB    Patient reported Quality of Life (QOL) Measures   Patient Supplied Answers To VHI Questionnaire  Voice Handicap Index (VHI-10) 7/28/2022   My voice makes it difficult for people to hear me 3   People have difficulty  "understanding me in a noisy room 3   My voice difficulties restrict my personal and social life.  3   I feel left out of conversations because of my voice 2   My voice problem causes me to lose income 0   I feel as though I have to strain to produce voice 3   The clarity of my voice is unpredictable 3   My voice problem upsets me 2   My voice makes me feel handicapped 2   People ask, \"What's wrong with your voice?\" 2   VHI-10 23         Patient Supplied Answers To EAT Questionnaire  Eating Assessment Tool (EAT-10) 2022   My swallowing problem has caused me to lose weight 0   My swallowing problem interferes with my ability to go out for meals 0   Swallowing liquids takes extra effort 0   Swallowing solids takes extra effort 0   Swallowing pills takes extra effort 0   Swallowing is painful 0   The pleasure of eating is affected by my swallowing 0   When I swallow food sticks in my throat 0   I cough when I eat 0   Swallowing is stressful 0   EAT-10 0         Patient Supplied Answers To CSI Questionnaire  Cough Severity Index (CSI) 2022   My cough is worse when I lie down 0   My coughing problem causes me to restrict my personal and social life 0   I tend to avoid places because of my cough problem 0   I feel embarrassed because of my coughing problem 0   People ask, ''What's wrong?'' because I cough a lot 0   I run out of air when I cough 0   My coughing problem affects my voice 0   My coughing problem limits my physical activity 0   My coughing problem upsets me 0   People ask me if I am sick because I cough a lot 0   CSI Score 0         Patient Supplied Answers to Dyspnea Index Questionnaire:  No flowsheet data found.     Impression & Plan     IMPRESSION: Mr. Odom is a 61 year old male who is being seen for the followin. Dysphonia  - noticed hoarse voice 3-4 years ago unknown cause  - voice becomes tight and cuts off intermittently  - able to get short phrases out  - worse when breathing cold " air in  - has been seen at other places and was told things look healthy structurally  - Dr. 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 (voice) [facility], CCC/SLP  - then underwent trial botox injection to both TAs, 1u on 9/27/22  - symptoms 10/4/22 - breathiness kicked in on Saturday, has had some coughing if he chugs liquids and if he has carbonate drinks  - discussed keeping a log of his symtpoms  Plan  - small sips of liquids only  - keep log  - send [employer] website for reference    RETURN VISIT: as scheduled      Kathi Christianson MD    Laryngology    [facility]  Department of  Otolaryngology - Head and Neck Surgery  Clinics & Surgery Center  [address] [place] [id number]  Appointment line: [phone]  Fax: [phone]    15 minutes spent on the date of the encounter doing chart review, patient visit, documentation and discussion with family

## 2022-10-04 NOTE — LETTER
10/4/2022       RE: Dennys Odom  [address] [facility] [address]     Dear Colleague,    Thank you for referring your patient, Dennys Odom, to the [facility] EAR NOSE AND THROAT CLINIC [place] at [facility]. Please see a copy of my visit note below.          [facility]   at the [facility]   Otolaryngology Clinic     Patient: Dennys Odom    MRN: [id number]    : 1960    Age/Gender: 61 year old male  Date of Service: 10/4/2022  Rendering Provider:   Kathi Christianson MD     Chief Complaint   Dysphonia  S/p TA botox injection 22  Interval History   HISTORY OF PRESENT ILLNESS: Mr. Odom is a 61 year old male is being followed for dysphonia. He was initially seen on 22. Please refer to this note for full history.     Today, he presents for follow up with his wife. He reports:  - talking more  - voice is changed, more airy/hoarse  - volume is less than before   - started talking more on 10/1  - less self-conscious about talking  - when drinks too fast or takes carbonated drink, he has difficulty swallowing    PAST MEDICAL HISTORY:   Past Medical History:   Diagnosis Date     Atrial fibrillation (H)      Chronic maxillary sinusitis      Hyperlipidemia      Right knee pain      Snoring        PAST SURGICAL HISTORY:   Past Surgical History:   Procedure Laterality Date     LARYNGOSCOPY, FLEXIBLE WITH INJECTION N/A 2022    Procedure: LARYNGOSCOPY, FLEXIBLE WITH INJECTION OF BOTOX;  Surgeon: Kathi Christianson MD;  Location: [facility] OR       CURRENT MEDICATIONS:   Current Outpatient Medications:      ibuprofen (ADVIL) 200 MG capsule, Take 200 mg by mouth every 4 hours as needed for fever, Disp: , Rfl:     ALLERGIES: Penicillins    SOCIAL HISTORY:    Social History     Socioeconomic History     Marital status:      Spouse name: Not on file     Number of children: Not on file     Years of  education: Not on file     Highest education level: Not on file   Occupational History     Not on file   Tobacco Use     Smoking status: Never Smoker     Smokeless tobacco: Former User   Substance and Sexual Activity     Alcohol use: Yes     Comment: a couple beers on weekends     Drug use: Not on file     Sexual activity: Not on file   Other Topics Concern     Not on file   Social History Narrative     Not on file     Social Determinants of Health     Financial Resource Strain: Not on file   Food Insecurity: Not on file   Transportation Needs: Not on file   Physical Activity: Not on file   Stress: Not on file   Social Connections: Not on file   Intimate Partner Violence: Not on file   Housing Stability: Not on file         FAMILY HISTORY:   Family History   Problem Relation Age of Onset     No Known Problems Mother      Hypertension Father      Obesity Father      No Known Problems Sister      No Known Problems Sister      No Known Problems Sister      No Known Problems Sister       Non-contributory for problems with anesthesia    REVIEW OF SYSTEMS:   The patient was asked a 14 point review of systems regarding constitutional symptoms, eye symptoms, ears, nose, mouth, throat symptoms, cardiovascular symptoms, respiratory symptoms, gastrointestinal symptoms, genitourinary symptoms, musculoskeletal symptoms, integumentary symptoms, neurological symptoms, psychiatric symptoms, endocrine symptoms, hematologic/lymphatic symptoms, and allergic/ immunologic symptoms.   The pertinent factors have been included in the HPI and below.  Patient Supplied Answers to Review of Systems   ENT ROS 10/4/2022   Ears, Nose, Throat: Hearing loss, Ringing/noise in ears       Physical Examination   The patient underwent a physical examination as described below. The pertinent positive and negative findings are summarized after the description of the examination.  Constitutional: The patient's developmental and nutritional status was  assessed. The patient's voice quality was assessed.  Head and Face: The head and face were inspected for deformities. Facial muscle strength was assessed bilaterally.  Eyes: Extraocular movements and primary gaze alignment were assessed.  Ears, Nose, Mouth and Throat: The ears and nose were examined for deformities.The lips were examined for abnormalities.   Neck: The neck was visualized for abnormal neck masses  Respiratory: The nature of the breathing was observed.  Extremities: The hand extremities were examined for any clubbing or cyanosis.  Skin: The skin was examined for inflammatory or neoplastic conditions.  Neurologic: The patient's orientation, mood, and affect were noted. The cranial nerve  functions were examined.  Other pertinent positive and negative findings on physical examination:   Breathing comfortably on room air, no stridor with deep inspiration  no throat clearing throughout the visit     All other physical examination findings were within normal limits and noncontributory    Review of Relevant Clinical Data   I personally reviewed:    Labs:  No results found for: TSH  Lab Results   Component Value Date    .0 11/20/2018    BUN 14.0 11/20/2018     No results found for: WBC, HGB, HCT, MCV, PLT  No results found for: PT, PTT, INR  No results found for: DERIK  No components found for: RHEUMATOIDFACTOR,  RF  No results found for: CRP  No components found for: CKTOT, URICACID  No components found for: C3, C4, DSDNAAB, NDNAABIFA  No results found for: MPOAB    Patient reported Quality of Life (QOL) Measures   Patient Supplied Answers To VHI Questionnaire  Voice Handicap Index (VHI-10) 7/28/2022   My voice makes it difficult for people to hear me 3   People have difficulty understanding me in a noisy room 3   My voice difficulties restrict my personal and social life.  3   I feel left out of conversations because of my voice 2   My voice problem causes me to lose income 0   I feel as though I have  "to strain to produce voice 3   The clarity of my voice is unpredictable 3   My voice problem upsets me 2   My voice makes me feel handicapped 2   People ask, \"What's wrong with your voice?\" 2   VHI-10 23         Patient Supplied Answers To EAT Questionnaire  Eating Assessment Tool (EAT-10) 2022   My swallowing problem has caused me to lose weight 0   My swallowing problem interferes with my ability to go out for meals 0   Swallowing liquids takes extra effort 0   Swallowing solids takes extra effort 0   Swallowing pills takes extra effort 0   Swallowing is painful 0   The pleasure of eating is affected by my swallowing 0   When I swallow food sticks in my throat 0   I cough when I eat 0   Swallowing is stressful 0   EAT-10 0         Patient Supplied Answers To CSI Questionnaire  Cough Severity Index (CSI) 2022   My cough is worse when I lie down 0   My coughing problem causes me to restrict my personal and social life 0   I tend to avoid places because of my cough problem 0   I feel embarrassed because of my coughing problem 0   People ask, ''What's wrong?'' because I cough a lot 0   I run out of air when I cough 0   My coughing problem affects my voice 0   My coughing problem limits my physical activity 0   My coughing problem upsets me 0   People ask me if I am sick because I cough a lot 0   CSI Score 0         Patient Supplied Answers to Dyspnea Index Questionnaire:  No flowsheet data found.     Impression & Plan     IMPRESSION: Mr. Odom is a 61 year old male who is being seen for the followin. Dysphonia  - noticed hoarse voice 3-4 years ago unknown cause  - voice becomes tight and cuts off intermittently  - able to get short phrases out  - worse when breathing cold air in  - has been seen at other places and was told things look healthy structurally  - Dr. 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 (voice), M.A., CCC/SLP  - then underwent trial botox injection to both TAs, 1u on 22  - symptoms 10/4/22 - breathiness kicked in on Saturday, has had some coughing if he chugs liquids and if he has carbonate drinks  - discussed keeping a log of his symtpoms  Plan  - small sips of liquids only  - keep log  - send [facility] website for reference    RETURN VISIT: as scheduled      Kathi Christianson MD    Laryngology    [facility]  Department of  Otolaryngology - Head and Neck Surgery  Clinics & Surgery Center  [address] [place] [address]  Appointment line: [phone]  Fax: [phone]    15 minutes spent on the date of the encounter doing chart review, patient visit, documentation and discussion with family           [facility]   at the [facility]   Otolaryngology Clinic     Patient: Dennys Odom    MRN: [id number]    : 1960    Age/Gender: 61 year old male  Date of Service: 10/4/2022  Rendering Provider:   Kathi Christianson MD      PAST MEDICAL HISTORY:   Past Medical History:   Diagnosis Date     Atrial fibrillation (H)      Chronic maxillary sinusitis      Hyperlipidemia      Right knee pain      Snoring        PAST SURGICAL HISTORY:   Past  Surgical History:   Procedure Laterality Date     LARYNGOSCOPY, FLEXIBLE WITH INJECTION N/A 9/27/2022    Procedure: LARYNGOSCOPY, FLEXIBLE WITH INJECTION OF BOTOX;  Surgeon: Kathi Christianson MD;  Location: [facility] OR       CURRENT MEDICATIONS:   Current Outpatient Medications:      ibuprofen (ADVIL) 200 MG capsule, Take 200 mg by mouth every 4 hours as needed for fever, Disp: , Rfl:     ALLERGIES: Penicillins    SOCIAL HISTORY:    Social History     Socioeconomic History     Marital status:      Spouse name: Not on file     Number of children: Not on file     Years of education: Not on file     Highest education level: Not on file   Occupational History     Not on file   Tobacco Use     Smoking status: Never Smoker     Smokeless tobacco: Former User   Substance and Sexual Activity     Alcohol use: Yes     Comment: a couple beers on weekends     Drug use: Not on file     Sexual activity: Not on file   Other Topics Concern     Not on file   Social History Narrative     Not on file     Social Determinants of Health     Financial Resource Strain: Not on file   Food Insecurity: Not on file   Transportation Needs: Not on file   Physical Activity: Not on file   Stress: Not on file   Social Connections: Not on file   Intimate Partner Violence: Not on file   Housing Stability: Not on file         FAMILY HISTORY:   Family History   Problem Relation Age of Onset     No Known Problems Mother      Hypertension Father      Obesity Father      No Known Problems Sister      No Known Problems Sister      No Known Problems Sister      No Known Problems Sister       Non-contributory for problems with anesthesia    REVIEW OF SYSTEMS:   The patient was asked a 14 point review of systems regarding constitutional symptoms, eye symptoms, ears, nose, mouth, throat symptoms, cardiovascular symptoms, respiratory symptoms, gastrointestinal symptoms, genitourinary symptoms, musculoskeletal symptoms, integumentary symptoms, neurological  "symptoms, psychiatric symptoms, endocrine symptoms, hematologic/lymphatic symptoms, and allergic/ immunologic symptoms.   The pertinent factors have been included in the HPI and below.  Patient Supplied Answers to Review of Systems  [facility] ROS 10/2/2022   Ears, Nose, Throat: Hearing loss, Ringing/noise in ears       Physical Examination   Deferred due to video visit    Review of Relevant Clinical Data   I personally reviewed:  Labs:  No results found for: TSH  Lab Results   Component Value Date    .0 11/20/2018    BUN 14.0 11/20/2018     No results found for: WBC, HGB, HCT, MCV, PLT  No results found for: PT, PTT, INR  No results found for: DERIK  No components found for: RHEUMATOIDFACTOR,  RF  No results found for: CRP  No components found for: CKTOT, URICACID  No components found for: C3, C4, DSDNAAB, NDNAABIFA  No results found for: MPOAB    Patient reported Quality of Life (QOL) Measures   Patient Supplied Answers To VHI Questionnaire  Voice Handicap Index (VHI-10) 7/28/2022   My voice makes it difficult for people to hear me 3   People have difficulty understanding me in a noisy room 3   My voice difficulties restrict my personal and social life.  3   I feel left out of conversations because of my voice 2   My voice problem causes me to lose income 0   I feel as though I have to strain to produce voice 3   The clarity of my voice is unpredictable 3   My voice problem upsets me 2   My voice makes me feel handicapped 2   People ask, \"What's wrong with your voice?\" 2   VHI-10 23         Patient Supplied Answers To EAT Questionnaire  Eating Assessment Tool (EAT-10) 6/8/2022   My swallowing problem has caused me to lose weight 0   My swallowing problem interferes with my ability to go out for meals 0   Swallowing liquids takes extra effort 0   Swallowing solids takes extra effort 0   Swallowing pills takes extra effort 0   Swallowing is painful 0   The pleasure of eating is affected by my swallowing 0   When I " swallow food sticks in my throat 0   I cough when I eat 0   Swallowing is stressful 0   EAT-10 0         Patient Supplied Answers To CSI Questionnaire  Cough Severity Index (CSI) 6/8/2022   My cough is worse when I lie down 0   My coughing problem causes me to restrict my personal and social life 0   I tend to avoid places because of my cough problem 0   I feel embarrassed because of my coughing problem 0   People ask, ''What's wrong?'' because I cough a lot 0   I run out of air when I cough 0   My coughing problem affects my voice 0   My coughing problem limits my physical activity 0   My coughing problem upsets me 0   People ask me if I am sick because I cough a lot 0   CSI Score 0         Patient Supplied Answers to Dyspnea Index Questionnaire:  No flowsheet data found.    Impression & Plan     IMPRESSION: Mr. Odom is a 61 year old male who is being seen for the following:      Kathi Christianson MD    Laryngology    [facility]  Department of  Otolaryngology - Head and Neck Surgery  [facility]  [address] [place] [address]  Appointment line: [phone]  Fax: [phone]  [email or web address]     Scribe Disclosure:  I, Suma Rubin, am serving as a scribe to document services personally performed by Kathi Christianson MD at this visit, based upon the provider's statements to me. All documentation has been reviewed by the aforementioned provider prior to being entered into the official medical record.       Again, thank you for allowing me to participate in the care of your patient.      Sincerely,    Kathi Christianson MD

## 2022-10-12 ENCOUNTER — VIRTUAL VISIT (OUTPATIENT)
Dept: OTOLARYNGOLOGY | Facility: CLINIC | Age: 62
End: 2022-10-12
Payer: COMMERCIAL

## 2022-10-12 DIAGNOSIS — R49.0 DYSPHONIA: Primary | ICD-10-CM

## 2022-10-12 DIAGNOSIS — J38.3 ADDUCTOR SPASMODIC DYSPHONIA: ICD-10-CM

## 2022-10-12 PROCEDURE — 92524 BEHAVRAL QUALIT ANALYS VOICE: CPT | Mod: GN | Performed by: SPEECH-LANGUAGE PATHOLOGIST

## 2022-10-12 PROCEDURE — 92507 TX SP LANG VOICE COMM INDIV: CPT | Mod: GN | Performed by: SPEECH-LANGUAGE PATHOLOGIST

## 2022-10-12 NOTE — PROGRESS NOTES
"Dennys Odom is a 61 year old male who is being evaluated via a billable video visit.      The patient has been notified and verbally consented to the following:     This video visit will be conducted between you and your provider.    Patient has opted to conduct today's video visit vs an in-person appointment, and is not able to attend due to possible exposure to COVID-19.      If during the course of the call the provider feels a video visit is not appropriate, you will not be charged for this service.    Call initiated at: 9:00  Type of Video Platform Used: Reddit  Location of provider: Residence  Location of patient: Residence    [facility]ICE [facility]INIC  REEVALUATION AND THERAPY NOTE (CPT 25226 & 42367)  Patient: Dennys Odom  Date of Service: 10/12/2022  Referring physician: Dr. Christianson  Impressions from most recent evaluation by Robbin Oneal, CCC-SLP on 6/14/2022:  \"Dennys Odom is presenting today with voice changes that began 3 to 4 years ago without inciting incident.  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 Laryngeal evaluation shows severe four-way constrictive supraglottic hyperfunction present across many vocal tasks but worsening in a spastic-like manner on voiced initial sounds.  Across numerous repetitions and variations in order patient consistently reported increased effort on voiced initial versus voiceless initial loaded stimuli.  These findings are consistent with perceptual evaluation which is dominated by strain with intermittent exacerbations on voiced sounds.  Patient was stimulable for improvement with focus on increasing respiratory flow but moments of strain are clearly still appreciated.\"    SUBJECTIVE:  Since the patient's last session, they report the following:     Overall " symptoms are significantly better    Progress / changes since botox    4-5 days post breathy voice    Some mindfulness required with thin liquids for the first 2 weeks    Making phone calls is easier    Able to talk all day without issue    Hard to project    Given changes to function following Botox injection on 9/27/2022 formal perceptual reevaluation is warranted to gauge current level of function and guide therapeutic modalities.  This is completed as below.    OBJECTIVE:  PATIENT REPORTED MEASURES:  Patient Specific Goal Metrics:  Dysponia SLP Goals 7/29/2022 8/10/2022 10/12/2022   How would you rate your speaking voice quality, if 0 is worst voice quality, and 10 is best voice? 3 4 8   How much effort is it to speak, if 0 is no extra effort and 10 is maximum effort? 6 5 1   How much does your voice problem bother you? Quite a bit Somewhat A little bit     *see end of note for standardized measures*    PERCEPTUAL EVALUATION (CPT 14737)  POSTURE / TENSION:     neck    BREATHING:     appears within normal limits and adequate     VOICE:    Roughness: Mild Intermittent    Breathiness: Mild to moderate Consistent    Strain: Minimal     Minimal to no appreciable spasm on voiced initial sounds    MPT    /s/ - 15 seconds    /z/ - 14 seconds    Loudness    Conversational speech:  WNL    Projected speech:  WNL     Shouting: mild to moderately reduced with greatly increased roughness    Pitch:    Sustained phonation - 117 Hz    Conversational speech:  WNL    Resonance:    Conversational speech:  WNL    Singing vs. Speech: Increased breathiness with elevated pitch in singing versus running speech    CAPE-V Overall Severity:  18/100    COUGH/THROAT CLEARING:    Occasional    THERAPEUTIC ACTIVITIES    Counseling and Education:    Discussion of progress following Botox injection  o The value of maintaining Botox log  o Timing for repeat injection relative to progress thus far    Therapeutic regimen reframed within notably  improved voice quality    Biweekly practice to maintain familiarity with therapeutic strategies    Gauging return to daily practice based on Botox log (effort increase above for poor quality decreased below 7)    Specific regimen instructed included    SOVT    Flow phonation stimuli    Respiratory phonatory coordination tasks on rote speech      A regimen for home practice was instructed.    I provided handouts of today's therapeutic activities to facilitate practice.    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 Chad reports that voice is substantially improved following his Botox injection on 9/27/2022 particularly in the past several days.  Perceptual evaluation was warranted following change in physiology from this injection, and demonstrates reduction of voiced initial spasms to near 0 levels, with corresponding increase in breathiness showing good effect from the injection.  At this point he feels he is able to use his voice as he would like to without restriction, but therapeutic exercises were refocused relative to the known trajectory of Botox so that he can utilize them as the effect wanes.    PLAN: I will see Chad in 4 weeks, at which point we will gauge maintenance of gains and determine the need and direction for ongoing intervention.   For practice goals see AVS.     TOTAL SERVICE TIME: 40 minutes  Perceptual evaluation of voice and resonance (39256)  TREATMENT (77327)  NO CHARGE FACILITY FEE (95719)    Today's session and note was completed in tandem with Marito Yadav,  clinician in speech language hearing sciences. I was present during today's appointment and have reviewed / agree with the  "contents of this note.    Wyatt Oneal M.M., M.A., CCC-SLP  Speech-Language Pathologist  Certificate of Vocology  [phone]    *this report was created in part through the use of computerized dictation software, and though reviewed following completion, some typographic errors may persist.  If there is confusion regarding any of this notes contents, please contact me for clarification.*    Patient Supplied Answers To Last 2 VHI Questionnaires  Voice Handicap Index (VHI-10) 7/28/2022 10/9/2022   My voice makes it difficult for people to hear me 3 2   People have difficulty understanding me in a noisy room 3 2   My voice difficulties restrict my personal and social life.  3 2   I feel left out of conversations because of my voice 2 2   My voice problem causes me to lose income 0 0   I feel as though I have to strain to produce voice 3 2   The clarity of my voice is unpredictable 3 2   My voice problem upsets me 2 2   My voice makes me feel handicapped 2 2   People ask, \"What's wrong with your voice?\" 2 2   VHI-10 23 18        Patient Supplied Answers To Last 2 CSI Questionnaires  Cough Severity Index (CSI) 6/8/2022   My cough is worse when I lie down 0   My coughing problem causes me to restrict my personal and social life 0   I tend to avoid places because of my cough problem 0   I feel embarrassed because of my coughing problem 0   People ask, ''What's wrong?'' because I cough a lot 0   I run out of air when I cough 0   My coughing problem affects my voice 0   My coughing problem limits my physical activity 0   My coughing problem upsets me 0   People ask me if I am sick because I cough a lot 0   CSI Score 0        Patient Supplied Answers To Last 2 EAT Questionnaires  Eating Assessment Tool (EAT-10) 6/8/2022   My swallowing problem has caused me to lose weight 0   My swallowing problem interferes with my ability to go out for meals 0   Swallowing liquids takes extra effort 0   Swallowing solids takes extra " effort 0   Swallowing pills takes extra effort 0   Swallowing is painful 0   The pleasure of eating is affected by my swallowing 0   When I swallow food sticks in my throat 0   I cough when I eat 0   Swallowing is stressful 0   EAT-10 0

## 2022-10-12 NOTE — LETTER
"10/12/2022       RE: Dennys Odom  [address] [id number]     Dear Colleague,    Thank you for referring your patient, Dennys Odom, to the [facility] [place] at [facility]. Please see a copy of my visit note below.    Dennys Odom is a 61 year old male who is being evaluated via a billable video visit.      The patient has been notified and verbally consented to the following:     This video visit will be conducted between you and your provider.    Patient has opted to conduct today's video visit vs an in-person appointment, and is not able to attend due to possible exposure to COVID-19.      If during the course of the call the provider feels a video visit is not appropriate, you will not be charged for this service.    Call initiated at: 9:00  Type of Video Platform Used: WaveConnex  Location of provider: Residence  Location of patient: [facility]ICE [facility]  REEVALUATION AND THERAPY NOTE (CPT 91721 & 61205)  Patient: Dennys Odom  Date of Service: 10/12/2022  Referring physician: Dr. Christianson  Impressions from most recent evaluation by Robbin Oneal CCC-SLP on 6/14/2022:  \"Dennys Odom is presenting today with voice changes that began 3 to 4 years ago without inciting incident.  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 Laryngeal evaluation shows severe four-way constrictive supraglottic hyperfunction present across many vocal tasks but worsening in a spastic-like manner on voiced initial sounds.  Across numerous repetitions and variations in order patient consistently reported increased effort on voiced initial versus voiceless initial loaded stimuli.  These findings are consistent with " "perceptual evaluation which is dominated by strain with intermittent exacerbations on voiced sounds.  Patient was stimulable for improvement with focus on increasing respiratory flow but moments of strain are clearly still appreciated.\"    SUBJECTIVE:  Since the patient's last session, they report the following:     Overall symptoms are significantly better    Progress / changes since botox    4-5 days post breathy voice    Some mindfulness required with thin liquids for the first 2 weeks    Making phone calls is easier    Able to talk all day without issue    Hard to project    Given changes to function following Botox injection on 9/27/2022 formal perceptual reevaluation is warranted to gauge current level of function and guide therapeutic modalities.  This is completed as below.    OBJECTIVE:  PATIENT REPORTED MEASURES:  Patient Specific Goal Metrics:  Dysponia SLP Goals 7/29/2022 8/10/2022 10/12/2022   How would you rate your speaking voice quality, if 0 is worst voice quality, and 10 is best voice? 3 4 8   How much effort is it to speak, if 0 is no extra effort and 10 is maximum effort? 6 5 1   How much does your voice problem bother you? Quite a bit Somewhat A little bit     *see end of note for standardized measures*    PERCEPTUAL EVALUATION (CPT 47528)  POSTURE / TENSION:     neck    BREATHING:     appears within normal limits and adequate     VOICE:    Roughness: Mild Intermittent    Breathiness: Mild to moderate Consistent    Strain: Minimal     Minimal to no appreciable spasm on voiced initial sounds    MPT    /s/ - 15 seconds    /z/ - 14 seconds    Loudness    Conversational speech:  WNL    Projected speech:  WNL     Shouting: mild to moderately reduced with greatly increased roughness    Pitch:    Sustained phonation - 117 Hz    Conversational speech:  WNL    Resonance:    Conversational speech:  WNL    Singing vs. Speech: Increased breathiness with elevated pitch in singing versus running " speech    CAPE-V Overall Severity:  18/100    COUGH/THROAT CLEARING:    Occasional    THERAPEUTIC ACTIVITIES    Counseling and Education:    Discussion of progress following Botox injection  o The value of maintaining Botox log  o Timing for repeat injection relative to progress thus far    Therapeutic regimen reframed within notably improved voice quality    Biweekly practice to maintain familiarity with therapeutic strategies    Gauging return to daily practice based on Botox log (effort increase above for poor quality decreased below 7)    Specific regimen instructed included    SOVT    Flow phonation stimuli    Respiratory phonatory coordination tasks on rote speech      A regimen for home practice was instructed.    I provided handouts of today's therapeutic activities to facilitate practice.    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 Chad reports that voice is substantially improved following his Botox injection on 9/27/2022 particularly in the past several days.  Perceptual evaluation was warranted following change in physiology from this injection, and demonstrates reduction of voiced initial spasms to near 0 levels, with corresponding increase in breathiness showing good effect from the injection.  At this point he feels he is able to use his voice as he would like to without restriction, but therapeutic exercises were refocused relative to the known trajectory of Botox so that he can utilize them as the effect wanes.    PLAN: I will see Chad in 4 weeks, at which point we will gauge maintenance of gains and determine the need and direction for ongoing intervention.   For practice goals see AVS.     TOTAL SERVICE  "TIME: 40 minutes  Perceptual evaluation of voice and resonance (84370)  TREATMENT (58055)  NO CHARGE FACILITY FEE (21725)    Today's session and note was completed in tandem with Marito Yadav,  clinician in speech language hearing sciences. I was present during today's appointment and have reviewed / agree with the contents of this note.    Wyatt Oneal M.M., M.A., CCC-SLP  Speech-Language Pathologist  Certificate of Vocology  [phone]    *this report was created in part through the use of computerized dictation software, and though reviewed following completion, some typographic errors may persist.  If there is confusion regarding any of this notes contents, please contact me for clarification.*    Patient Supplied Answers To Last 2 VHI Questionnaires  Voice Handicap Index (VHI-10) 7/28/2022 10/9/2022   My voice makes it difficult for people to hear me 3 2   People have difficulty understanding me in a noisy room 3 2   My voice difficulties restrict my personal and social life.  3 2   I feel left out of conversations because of my voice 2 2   My voice problem causes me to lose income 0 0   I feel as though I have to strain to produce voice 3 2   The clarity of my voice is unpredictable 3 2   My voice problem upsets me 2 2   My voice makes me feel handicapped 2 2   People ask, \"What's wrong with your voice?\" 2 2   VHI-10 23 18        Patient Supplied Answers To Last 2 CSI Questionnaires  Cough Severity Index (CSI) 6/8/2022   My cough is worse when I lie down 0   My coughing problem causes me to restrict my personal and social life 0   I tend to avoid places because of my cough problem 0   I feel embarrassed because of my coughing problem 0   People ask, ''What's wrong?'' because I cough a lot 0   I run out of air when I cough 0   My coughing problem affects my voice 0   My coughing problem limits my physical activity 0   My coughing problem upsets me 0   People ask me if I am sick because " I cough a lot 0   CSI Score 0        Patient Supplied Answers To Last 2 EAT Questionnaires  Eating Assessment Tool (EAT-10) 6/8/2022   My swallowing problem has caused me to lose weight 0   My swallowing problem interferes with my ability to go out for meals 0   Swallowing liquids takes extra effort 0   Swallowing solids takes extra effort 0   Swallowing pills takes extra effort 0   Swallowing is painful 0   The pleasure of eating is affected by my swallowing 0   When I swallow food sticks in my throat 0   I cough when I eat 0   Swallowing is stressful 0   EAT-10 0           Again, thank you for allowing me to participate in the care of your patient.      Sincerely,    Robbin Oneal, SLP

## 2022-10-12 NOTE — PATIENT INSTRUCTIONS
Rony Bonilla!    It's great to hear the the botox injection went well and your voice is doing better. Remember to work on a log that frames your recovery after the botox injection. This will be helpful for a benchmark for comparison after your subsequent injections.  This is sent to you in another email version of the same message that he should get soon.    We want you to continue doing your exercises, especially on days when you are feeling a 7/10 or below in terms of quality or a 4 or above in terms of effort.  Up until that point do the exercises once or twice a week to make sure that you remember how to do them and feel confident you can continue to do so.  As a reminder, below are the exercises.    See you soon!  Robbin & Marito    Cup and Bubble Exercises      WHY:  Though these exercises seems (and feels) silly they are helpful for a number of reasons.  First, they make you use your air generously and consistently, helping you to coordinate your breath and your voice.  Second, they lengthen and narrow the vocal tract with the straw.  This narrowing (or semi-occlusion in scientific terms) creates back pressure in your throat which has been shown to help the vocal folds vibrate more easily and reduce how hard some of the other muscles are squeezing.    To practice breathing: Start off in a forward leaning position with your elbows on your knees.  Feel the expansion into your belly and ribcage as you inhale and gentle contraction in the same area as you exhale.  Let yourself sit back up and maintain that pattern.    HOW:    With Water - Fill the cup (or bottle) about 2 inches full of water. Blow bubbles through the straw while keeping your voice on. (kind of like making an  ooooo  sound through straw).Keep the water bubbling the whole time. That means your air is moving consistently.      Without Water - make sound through the straw (like it's a kazoo).  Make sure you are using your air freely.  You can hold your hand  in front of the straw to test, and you should be able to feel the air moving.    Or you can use an easy  ooo  sound (like in the word  hue ) tongue or lip bubble!    Feel how open and relaxed your throat feels when you practice these sounds. If the bubbling or air stops or it gets tight, don't sweat it.  Just breath, relax, and start again.  Across all the exercises make sure you are getting a nice low breath and feeling the steady inward motion of low abdominal muscles when making sound.    Practice 3-5 times a day for no more than 3 minutes, and whenever you feel fatigued.    Aren't sure if you are doing it right? Ask yourself these three questions:  Does it feel easy?  Are the bubbles and voice consistent?  Do you feel that forward buzz?      What sounds to make:    Start off with just bubbles to give yourself a point of comparison for how little you need to work in your throat    Single pitches - use any comfortable pitch and sustain the note for as long as it feels free and easy, and your lips or tongue are bubbling.      Sighs - glide from high to low like you are sitting down in a comfortable chair after a long day of work.  The goal on this one is the low pitch, so don't worry about starting too high. This is about the low note    [place] - Start on a medium pitch and glide up just a bit and back down.  The goal on this one is the high pitch. This is about the high note     U  Words    Beginning Middle End   you cube few   use huge cue   union fume new   usury mute pew   eulogy feud chew   usual cute lieu   unify mule view   unit pure hue   unicorn music mew   youth beauty skew   euphemism future you   ewer humid spew   eunuch fuse adieu   [place] humor debut   [place] pupil renew   quentin muñoz imbue   useful human review   uniform muse argue     Start with the End column. Take a little breath and flow through the word feeling the buzz at your lips  Move on to three words in a row Beginning => middle => End  Now  count aloud flowing from one word to the next, then  something to read finding the same.   As you go through your day, try to feel that flowing from word to word.  We'll work more on this next time.     Practice this 2-3 times per day, but not for very long.  Look to bring it in to your daily speech!    _____________________________________________________________________________________________  Respiratory Phonatory Coordination  Breathing for speech and singing should use the strong muscles of your abdominal wall and low rib cage to do the heavy lifting, taking the burden off of the small muscles surrounding your larynx.  These exercises are designed to help you find that low respiratory engagement, raise your awareness of optimal lung volumes for speech, and then make these patterns habit so that you can carry them over to your daily voice use.  These exercises draw heavily from the work of Martha Koo, an acting voice specialist and SLP.     STEP 1 - Finding low respiratory engagement  Lean forward in a chair with your elbows resting on your knees.  Exhale all your air out on a  sh  sound like you are gently shushing a baby.  Feel the gentle expansion in your belly and your rib cage along your lower back while inhale, and the gentle contraction in these same areas while you exhale.     Inhaling shouldn't be a dramatic event!  Aim for an easy / silent inhalation which you allow to just fall in rather than gasping or reaching for the air.  To feel this sensation try the following:  Expel all of your air on a  sh  sound  With all of your air gone wait for a count of 3 more more  When you can't wait any longer simply release the muscles you are holding to keep the air out  It won't be a huge breath, but you will feel the air simply fall in.  Repeat this two more times to get accustomed to allowing rather than forcing your air in.  Now return to easy breathing in and out (without the need to wait after the  exhale) trying to maintain the sensation of that easy silent inhalation  STEP 2 - Awareness of lung capacity  Ensuring that you take adequate breath before you start to speak or sing allows you to use the recoil forces of your rib-cage and abdominal wall to your benefit, while speaking too low on lung volume makes these forces work against you.  These exercises are designed to make you aware of these lung volumes so that you can use your air to best effect.  Recognize the upper and lower limits  Inhale as deeply as you possibly can.   Exhale as deeply as you possibly can.   Repeat 2 times paying attention to the amount of effort it takes, and any physical tension in your neck, shoulders, abdomen, and rib-cage.  Doing this you'll begin to recognize that exhaling or inhaling to our maximum capacity can actually create MORE tension rather than less.  For best voice use you want inhale only to comfortably full lung volume.  For exhalation the next exercise will help you recognize when your lung volume is too low to best support your voice.  Learning the landmarks for exhalation  Take a comfortably full breath (easy and silent as we practiced above)  In a chant-like voice connecting one word to the next count as high as you can on a single breath.  Pay special attention to the following:  Initially this is easy to do as the recoil forces from your ribcage and abdomen are adding to your respiratory drive  At some point toward the middle you will recognize that you have started to work a tiny bit harder to keep voice going.  This is the mid-way point!  Toward the end you will have to work VERY hard to get your voice out, and there will be a notable decrease in voice quality.  This is the point of no return!  Repeat this 2-4 times recognizing those landmarks.   Ultimately, to be efficient with your voicing you will want to breathe after the mid-way point but before the point of no return, but you need to be aware of these  sensations to make this change.     STEP 3 - Putting it all together  On these exercises, do your best to integrate steps one and two.  Maximum functional phrase length.   Take a comfortably full breath.    In a chant-like voice connecting one word to next begin counting.    Once you have passed the mid-way point but before you reach the point of no return allow yourself to take a low easy breath refilling your lung volume.  Take as much time for this as you need.  Continue this pattern counting up to the number 50.  Set phrase lengths:   For each of these you will use the same chant-like counting. Instead of taking the comfortably full breath from before, just take enough air to reach the goal number (5 for groups of 5, 2 for groups of 2) without feeling like you have TOO much left over, or reaching the point of no return. After doing several sets in a row you will find that balance point  Groups of 5 counting up to 25  Groups of 4 counting  up to 20  Groups of 3 counting up to 15  Groups of 2 counting up to 10  Groups of 1 counting up to 5  Varied phrase lengths:  Like the previous exercises you will use the chantlike flowing counting.  But this time instead of having set phrase lengths YOU are in control of how small or large the phrase length is.  For example the first breath may take you to 15, the second only to 18, the third to 25, the fourth to 31, etc.  Reading passages:   a book, newspaper, poem, or just read your [employer] newsfeed.  As you are reading think about taking the right amount of breath for the coming sentence or phrase.  If at any point you have passed the mid-way point, give yourself permission to fill the tank back up before you reach the point of no return.  Keep the words connected to one another, to promote that sense of flow.  Feel free to make it chant-like at first, but with each repetition, let it be more like natural speech while keeping that flow and connection. Practice for 3  minutes then evaluate how things went.  Conversation:  Let yourself have a casual conversation with a friend or family member.  Make sure it's a light enough topic that you can let your attention drift to your breathing intermittently.  Check to make sure that you are allowing yourself to take that best low engaged comfortable breath, and that you are refilling regularly to avoid the point of no return.  Try to feel the same sense of airflow  throughout your speech, even if the chantlike aspect has been dialed down.

## 2022-11-14 NOTE — PROGRESS NOTES
Chad is a 61 year old who is being evaluated via a billable video visit.      How would you like to obtain your AVS? MyChart  If the video visit is dropped, the invitation should be resent by: Text to cell phone: [phone]  Will anyone else be joining your video visit? No        Video-Visit Details    Video Start Time: 10:05    Type of service:  Video Visit    Video End Time:10:13    Originating Location (pt. Location): Home        Distant Location (provider location):  On-site    Platform used for Video Visit: Corey        [facility]   at the [facility]   Otolaryngology Clinic     Patient: Dennys Odom    MRN: [id number]    : 1960    Age/Gender: 61 year old male  Date of Service: 11/15/2022  Rendering Provider:   Kathi Christianson MD       Chief Complaint   Dysphonia  S/p TA botox injection 22  Interval History   HISTORY OF PRESENT ILLNESS: Mr. Odom is a 61 year old male is being followed for dysphonia. he was initially seen on 2022. Please refer to this note for full history.     Today, he presents for follow up. he reports:  - voice is doing great  - very happy  - today is end of week 7 post surgery  - end of week 4, stopped coughing while eating and drinking  - voice 8/10 at week 3  - voice 9/10 since week 4    Date of injection   location/amount injected  breathiness kicked in  symptoms settled  symptoms started to wear off    22 1.0 unit(s) b/l TA Day 2, had coughing with liquids 4 weeks with liquids Not yet, week 7 is going well today          PAST MEDICAL HISTORY:   Past Medical History:   Diagnosis Date     Atrial fibrillation (H)      Chronic maxillary sinusitis      Hyperlipidemia      Right knee pain      Snoring        PAST SURGICAL HISTORY:   Past Surgical History:   Procedure Laterality Date     LARYNGOSCOPY, FLEXIBLE WITH INJECTION N/A 2022    Procedure: LARYNGOSCOPY, FLEXIBLE WITH INJECTION OF BOTOX;  Surgeon: Kathi Christianson MD;   Location: [facility] OR       CURRENT MEDICATIONS:   Current Outpatient Medications:      ibuprofen (ADVIL) 200 MG capsule, Take 200 mg by mouth every 4 hours as needed for fever, Disp: , Rfl:     ALLERGIES: Penicillins    SOCIAL HISTORY:    Social History     Socioeconomic History     Marital status:      Spouse name: Not on file     Number of children: Not on file     Years of education: Not on file     Highest education level: Not on file   Occupational History     Not on file   Tobacco Use     Smoking status: Never     Smokeless tobacco: Former   Substance and Sexual Activity     Alcohol use: Yes     Comment: a couple beers on weekends     Drug use: Not on file     Sexual activity: Not on file   Other Topics Concern     Not on file   Social History Narrative     Not on file     Social Determinants of Health     Financial Resource Strain: Not on file   Food Insecurity: Not on file   Transportation Needs: Not on file   Physical Activity: Not on file   Stress: Not on file   Social Connections: Not on file   Intimate Partner Violence: Not on file   Housing Stability: Not on file         FAMILY HISTORY:   Family History   Problem Relation Age of Onset     No Known Problems Mother      Hypertension Father      Obesity Father      No Known Problems Sister      No Known Problems Sister      No Known Problems Sister      No Known Problems Sister       Non-contributory for problems with anesthesia    REVIEW OF SYSTEMS:   The patient was asked a 14 point review of systems regarding constitutional symptoms, eye symptoms, ears, nose, mouth, throat symptoms, cardiovascular symptoms, respiratory symptoms, gastrointestinal symptoms, genitourinary symptoms, musculoskeletal symptoms, integumentary symptoms, neurological symptoms, psychiatric symptoms, endocrine symptoms, hematologic/lymphatic symptoms, and allergic/ immunologic symptoms.   The pertinent factors have been included in the HPI and below.  Patient Supplied Answers  to Review of Systems   ENT ROS 10/4/2022   Ears, Nose, Throat: Hearing loss, Ringing/noise in ears       Physical Examination   The patient underwent a physical examination as described below. The pertinent positive and negative findings are summarized after the description of the examination.  Constitutional: The patient's developmental and nutritional status was assessed. The patient's voice quality was assessed.  Head and Face: The head and face were inspected for deformities. Facial muscle strength was assessed bilaterally.  Eyes: Extraocular movements and primary gaze alignment were assessed.  Ears, Nose, Mouth and Throat: The ears and nose were examined for deformities.The lips were examined for abnormalities.   Neck: The neck was visualized for abnormal neck masses  Respiratory: The nature of the breathing was observed.  Extremities: The hand extremities were examined for any clubbing or cyanosis.  Skin: The skin was examined for inflammatory or neoplastic conditions.  Neurologic: The patient's orientation, mood, and affect were noted. The cranial nerve  functions were examined.  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 Chad reports that voice is substantially improved following his Botox injection on 9/27/2022 particularly in the past several days.  Perceptual  "evaluation was warranted following change in physiology from this injection, and demonstrates reduction of voiced initial spasms to near 0 levels, with corresponding increase in breathiness showing good effect from the injection.  At this point he feels he is able to use his voice as he would like to without restriction, but therapeutic exercises were refocused relative to the known trajectory of Botox so that he can utilize them as the effect wanes.  Radiology:    Pathology:    Procedures:    Labs:  No results found for: TSH  Lab Results   Component Value Date    .0 11/20/2018    BUN 14.0 11/20/2018     No results found for: WBC, HGB, HCT, MCV, PLT  No results found for: PT, PTT, INR  No results found for: DERIK  No components found for: RHEUMATOIDFACTOR,  RF  No results found for: CRP  No components found for: CKTOT, URICACID  No components found for: C3, C4, DSDNAAB, NDNAABIFA  No results found for: MPOAB    Patient reported Quality of Life (QOL) Measures   Patient Supplied Answers To VHI Questionnaire  Voice Handicap Index (VHI-10) 10/9/2022   My voice makes it difficult for people to hear me 2   People have difficulty understanding me in a noisy room 2   My voice difficulties restrict my personal and social life.  2   I feel left out of conversations because of my voice 2   My voice problem causes me to lose income 0   I feel as though I have to strain to produce voice 2   The clarity of my voice is unpredictable 2   My voice problem upsets me 2   My voice makes me feel handicapped 2   People ask, \"What's wrong with your voice?\" 2   VHI-10 18         Patient Supplied Answers To EAT Questionnaire  Eating Assessment Tool (EAT-10) 6/8/2022   My swallowing problem has caused me to lose weight 0   My swallowing problem interferes with my ability to go out for meals 0   Swallowing liquids takes extra effort 0   Swallowing solids takes extra effort 0   Swallowing pills takes extra effort 0   Swallowing is painful " 0   The pleasure of eating is affected by my swallowing 0   When I swallow food sticks in my throat 0   I cough when I eat 0   Swallowing is stressful 0   EAT-10 0         Patient Supplied Answers To CSI Questionnaire  Cough Severity Index (CSI) 2022   My cough is worse when I lie down 0   My coughing problem causes me to restrict my personal and social life 0   I tend to avoid places because of my cough problem 0   I feel embarrassed because of my coughing problem 0   People ask, ''What's wrong?'' because I cough a lot 0   I run out of air when I cough 0   My coughing problem affects my voice 0   My coughing problem limits my physical activity 0   My coughing problem upsets me 0   People ask me if I am sick because I cough a lot 0   CSI Score 0         @[email or web address]@    Impression & Plan     IMPRESSION: Mr. Odom is a 61 year old male who is being seen for the followin. Dysphonia  - noticed hoarse voice 3-4 years ago unknown cause  - voice becomes tight and cuts off intermittently  - able to get short phrases out  - worse when breathing cold air in  - has been seen at other places and was told things look healthy structurally  - Dr. 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 (voice), M.A., CCC/SLP  - then underwent trial botox injection to both TAs, 1u on 9/27/22  - symptoms 10/4/22 - breathiness kicked in on Saturday, has had some coughing if he chugs liquids and if he has carbonate drinks  - discussed keeping a log of his symptoms  - symptoms 11/15/2022 are improved  - happy with voice  - no trouble swallowing since week 4 post injection  - recommended repeat injection after effects wear off, consider reduced dose  - discussed option of doing EMG botox - will keep with transnasal approach  Plan  - repeat Botox injection 12/16/22 try to give early morning appointment, consider 0.75u b/l next     RETURN VISIT: 12/16/22 try to give early morning appointment    Scribe Disclosure:  I, Aleksander Hodge, am serving as a scribe to document services personally performed by Kathi Christianson MD based on data collection and the provider's statements to me.     Kathi Chrsitianson MD    Laryngology    [facility]  Department of  Otolaryngology - Head and Neck Surgery  Clinics & Surgery Center  [address] [place] [address]  Appointment line: [phone]  Fax: [phone]  [email or web address]

## 2022-11-15 ENCOUNTER — VIRTUAL VISIT (OUTPATIENT)
Dept: OTOLARYNGOLOGY | Facility: CLINIC | Age: 62
End: 2022-11-15
Payer: COMMERCIAL

## 2022-11-15 VITALS — BODY MASS INDEX: 29.57 KG/M2 | WEIGHT: 212 LBS

## 2022-11-15 DIAGNOSIS — J38.3 SPASMODIC DYSPHONIA: Primary | ICD-10-CM

## 2022-11-15 PROCEDURE — 99213 OFFICE O/P EST LOW 20 MIN: CPT | Mod: 95 | Performed by: OTOLARYNGOLOGY

## 2022-11-15 ASSESSMENT — PAIN SCALES - GENERAL: PAINLEVEL: NO PAIN (0)

## 2022-11-15 NOTE — LETTER
11/15/2022       RE: Dennys Odom  [address] [address]     Dear Colleague,    Thank you for referring your patient, Dennys Odom, to the [facility] [place] [facility]. Please see a copy of my visit note below.    Chad is a 61 year old who is being evaluated via a billable video visit.      How would you like to obtain your AVS? MyChart  If the video visit is dropped, the invitation should be resent by: Text to cell phone: [phone]  Will anyone else be joining your video visit? No        Video-Visit Details    Video Start Time: 10:05    Type of service:  Video Visit    Video End Time:10:13    Originating Location (pt. Location): Home        Distant Location (provider location):  On-site    Platform used for Video Visit: Corey        [facility]   at the [facility]   Otolaryngology Clinic     Patient: Dennys Odom    MRN: [id number]    : 1960    Age/Gender: 61 year old male  Date of Service: 11/15/2022  Rendering Provider:   Kathi Christianson MD       Chief Complaint   Dysphonia  S/p TA botox injection 22  Interval History   HISTORY OF PRESENT ILLNESS: Mr. Odom is a 61 year old male is being followed for dysphonia. he was initially seen on 2022. Please refer to this note for full history.     Today, he presents for follow up. he reports:  - voice is doing great  - very happy  - today is end of week 7 post surgery  - end of week 4, stopped coughing while eating and drinking  - voice 8/10 at week 3  - voice 9/10 since week 4    Date of injection   location/amount injected  breathiness kicked in  symptoms settled  symptoms started to wear off    22 1.0 unit(s) b/l TA Day 2, had coughing with liquids 4 weeks with liquids Not yet, week 7 is going well today          PAST MEDICAL HISTORY:   Past Medical History:   Diagnosis Date     Atrial  fibrillation (H)      Chronic maxillary sinusitis      Hyperlipidemia      Right knee pain      Snoring        PAST SURGICAL HISTORY:   Past Surgical History:   Procedure Laterality Date     LARYNGOSCOPY, FLEXIBLE WITH INJECTION N/A 9/27/2022    Procedure: LARYNGOSCOPY, FLEXIBLE WITH INJECTION OF BOTOX;  Surgeon: Kathi Christianson MD;  [place] [facility]       CURRENT MEDICATIONS:   Current Outpatient Medications:      ibuprofen (ADVIL) 200 MG capsule, Take 200 mg by mouth every 4 hours as needed for fever, Disp: , Rfl:     ALLERGIES: Penicillins    SOCIAL HISTORY:    Social History     Socioeconomic History     Marital status:      Spouse name: Not on file     Number of children: Not on file     Years of education: Not on file     Highest education level: Not on file   Occupational History     Not on file   Tobacco Use     Smoking status: Never     Smokeless tobacco: Former   Substance and Sexual Activity     Alcohol use: Yes     Comment: a couple beers on weekends     Drug use: Not on file     Sexual activity: Not on file   Other Topics Concern     Not on file   Social History Narrative     Not on file     Social Determinants of Health     Financial Resource Strain: Not on file   Food Insecurity: Not on file   Transportation Needs: Not on file   Physical Activity: Not on file   Stress: Not on file   Social Connections: Not on file   Intimate Partner Violence: Not on file   Housing Stability: Not on file         FAMILY HISTORY:   Family History   Problem Relation Age of Onset     No Known Problems Mother      Hypertension Father      Obesity Father      No Known Problems Sister      No Known Problems Sister      No Known Problems Sister      No Known Problems Sister       Non-contributory for problems with anesthesia    REVIEW OF SYSTEMS:   The patient was asked a 14 point review of systems regarding constitutional symptoms, eye symptoms, ears, nose, mouth, throat symptoms, cardiovascular symptoms, respiratory  symptoms, gastrointestinal symptoms, genitourinary symptoms, musculoskeletal symptoms, integumentary symptoms, neurological symptoms, psychiatric symptoms, endocrine symptoms, hematologic/lymphatic symptoms, and allergic/ immunologic symptoms.   The pertinent factors have been included in the HPI and below.  Patient Supplied Answers to Review of Systems   ENT ROS 10/4/2022   Ears, Nose, Throat: Hearing loss, Ringing/noise in ears       Physical Examination   The patient underwent a physical examination as described below. The pertinent positive and negative findings are summarized after the description of the examination.  Constitutional: The patient's developmental and nutritional status was assessed. The patient's voice quality was assessed.  Head and Face: The head and face were inspected for deformities. Facial muscle strength was assessed bilaterally.  Eyes: Extraocular movements and primary gaze alignment were assessed.  Ears, Nose, Mouth and Throat: The ears and nose were examined for deformities.The lips were examined for abnormalities.   Neck: The neck was visualized for abnormal neck masses  Respiratory: The nature of the breathing was observed.  Extremities: The hand extremities were examined for any clubbing or cyanosis.  Skin: The skin was examined for inflammatory or neoplastic conditions.  Neurologic: The patient's orientation, mood, and affect were noted. The cranial nerve  functions were examined.  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 Chad reports that voice is substantially improved following his Botox injection on 9/27/2022 particularly in the past several days.  Perceptual evaluation was warranted following change in physiology from this injection, and demonstrates reduction of voiced initial spasms to near 0 levels, with corresponding increase in breathiness showing good effect from the injection.  At this point he feels he is able to use his voice as he would like to without restriction, but therapeutic exercises were refocused relative to the known trajectory of Botox so that he can utilize them as the effect wanes.  Radiology:    Pathology:    Procedures:    Labs:  No results found for: TSH  Lab Results   Component Value Date    .0 11/20/2018    BUN 14.0 11/20/2018     No results found for: WBC, HGB, HCT, MCV, PLT  No results found for: PT, PTT, INR  No results found for: DERIK  No components found for: RHEUMATOIDFACTOR,  RF  No results found for: CRP  No components found for: CKTOT, URICACID  No components found for: C3, C4, DSDNAAB, NDNAABIFA  No results found for: MPOAB    Patient reported Quality of Life (QOL) Measures   Patient Supplied Answers To VHI Questionnaire  Voice Handicap Index (VHI-10) 10/9/2022   My voice makes it difficult for people to hear me 2   People have difficulty understanding me in a noisy room 2   My voice difficulties restrict my personal and social life.  2   I feel left out of conversations because of my voice 2   My voice problem causes me to lose income 0   I feel as though I have to strain to produce voice 2   The clarity of my voice is unpredictable 2   My voice problem upsets me 2   My voice makes me feel handicapped 2   People ask, \"What's wrong with your voice?\" 2   VHI-10 18         Patient Supplied Answers To EAT " Questionnaire  Eating Assessment Tool (EAT-10) 2022   My swallowing problem has caused me to lose weight 0   My swallowing problem interferes with my ability to go out for meals 0   Swallowing liquids takes extra effort 0   Swallowing solids takes extra effort 0   Swallowing pills takes extra effort 0   Swallowing is painful 0   The pleasure of eating is affected by my swallowing 0   When I swallow food sticks in my throat 0   I cough when I eat 0   Swallowing is stressful 0   EAT-10 0         Patient Supplied Answers To CSI Questionnaire  Cough Severity Index (CSI) 2022   My cough is worse when I lie down 0   My coughing problem causes me to restrict my personal and social life 0   I tend to avoid places because of my cough problem 0   I feel embarrassed because of my coughing problem 0   People ask, ''What's wrong?'' because I cough a lot 0   I run out of air when I cough 0   My coughing problem affects my voice 0   My coughing problem limits my physical activity 0   My coughing problem upsets me 0   People ask me if I am sick because I cough a lot 0   CSI Score 0         @[email or web address]@    Impression & Plan     IMPRESSION: Mr. Odom is a 61 year old male who is being seen for the followin. Dysphonia  - noticed hoarse voice 3-4 years ago unknown cause  - voice becomes tight and cuts off intermittently  - able to get short phrases out  - worse when breathing cold air in  - has been seen at other places and was told things look healthy structurally  - Dr. 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 (voice), M.A., CCC/SLP  - then underwent trial botox injection to both TAs, 1u on 9/27/22  - symptoms 10/4/22 - breathiness kicked in on Saturday, has had some coughing if he chugs liquids and if he has carbonate drinks  - discussed keeping a log of his symptoms  - symptoms 11/15/2022 are improved  - happy with voice  - no trouble swallowing since week 4 post injection  - recommended repeat injection after effects wear off, consider reduced dose  - discussed option of doing EMG botox - will keep with transnasal approach  Plan  - repeat Botox injection 12/16/22 try to give early morning appointment, consider 0.75u b/l next     RETURN VISIT: 12/16/22 try to give early morning appointment    Scribe Disclosure:  I, Aleksander Hodge, am serving as a scribe to document services personally performed by Kathi Christianson MD based on data collection and the provider's statements to me.         Again, thank you for allowing me to participate in the care of your patient.      Sincerely,    Kathi Christianson MD

## 2022-11-17 ENCOUNTER — VIRTUAL VISIT (OUTPATIENT)
Dept: OTOLARYNGOLOGY | Facility: CLINIC | Age: 62
End: 2022-11-17
Payer: COMMERCIAL

## 2022-11-17 DIAGNOSIS — R49.0 DYSPHONIA: Primary | ICD-10-CM

## 2022-11-17 DIAGNOSIS — J38.3 ADDUCTOR SPASMODIC DYSPHONIA: ICD-10-CM

## 2022-11-17 PROCEDURE — 92507 TX SP LANG VOICE COMM INDIV: CPT | Mod: GN | Performed by: SPEECH-LANGUAGE PATHOLOGIST

## 2022-11-17 NOTE — PATIENT INSTRUCTIONS
Rony Bonilla,    It was good to see you today and I am so glad things are going in a good direction.  Remember you can pick back up with the straw type exercises (or use a raspberry sound) to remind yourself with good flow feels like, move that into some of the U words, or even just say some everyday phrases thinking about connecting dots between words.  This will be a great remind her voice training for, and to keep things on the straight and narrow as the Botox continues to wane.  I also wanted to send you this check in a couple weeks after the procedure to see how things are doing.  If everything is still very good it may be worth connecting with Dr. Christianson's team about the timing for your repeat injection as sometimes these things can be deferred.  I wish you all the best, just keep doing what you are doing and I am confident you will continue to move forward in a positive way.    -Robbin

## 2022-11-17 NOTE — LETTER
"11/17/2022       RE: Dennys Odom  [address] [facility] [address]     Dear Colleague,    Thank you for referring your patient, Dennys Odom, to the [facility] VOICE CLINIC [place] at [facility]. Please see a copy of my visit note below.    Dennys Odom is a 61 year old male who is being evaluated via a billable video visit.      The patient has been notified and verbally consented to the following:     This video visit will be conducted between you and your provider.    Patient has opted to conduct today's video visit vs an in-person appointment, and is not able to attend due to possible exposure to COVID-19.      If during the course of the call the provider feels a video visit is not appropriate, you will not be charged for this service.    Call initiated at: 9:00  Type of Video Platform Used: [email or web address]  Location of provider: Residence  Location of patient: [facility] VOICE [facility]  THERAPY NOTE (CPT 42003)    Patient: Dennys Odom  Date of Service: 11/17/2022  Referring physician: Dr. Christianson  Impressions from most recent evaluation by Robbin Oneal CCC-SLP on 6/14/2022:  \"Dennys Odom is presenting today with voice changes that began 3 to 4 years ago without inciting incident.  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 Laryngeal evaluation shows severe four-way constrictive supraglottic hyperfunction present across many vocal tasks but worsening in a spastic-like manner on voiced initial sounds.  Across numerous repetitions and variations in order patient consistently reported increased effort on voiced initial versus voiceless initial loaded stimuli.  These findings are consistent with perceptual evaluation which " "is dominated by strain with intermittent exacerbations on voiced sounds.  Patient was stimulable for improvement with focus on increasing respiratory flow but moments of strain are clearly still appreciated.\"    SUBJECTIVE:  Since the patient's last session, they report the following:     Overall symptoms are improved    Continuing to improve currently rates his voice and 9 out of 10    OBJECTIVE:  PATIENT REPORTED MEASURES:  Patient Specific Goal Metrics:  Dysponia SLP Goals 8/10/2022 10/12/2022 11/17/2022   How would you rate your speaking voice quality, if 0 is worst voice quality, and 10 is best voice? 4 8 9   How much effort is it to speak, if 0 is no extra effort and 10 is maximum effort? 5 1 1   How much does your voice problem bother you? Somewhat A little bit Not at all       *see end of note for standardized measures*    THERAPEUTIC ACTIVITIES    Counseling and Education:    Patient had questions about Botox via laryngeal EMG versus as his previous injection was completed.  Answered to the best of my ability    Exercises to promote improved phonatory respiratory coordination    Semioccluded vocal tract exercises reviewed    Alternatives to straw phonation provided    Good accuracy with /w/ phoneme as well as raspberry    High flow contexts    [place] speech (counting) utilized to good effect    Mild intermittent strain appreciated but patient was able to reduce this with concentration on \"connecting the dots between words\"    Reminder about but formed through the use of everyday phrases encouraged      A regimen for home practice was instructed.    I provided handouts of today's therapeutic activities to facilitate practice.    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 Chad feels he is continuing to get excellent benefit from his Botox rating his voice is a 9 out of 10 (with 10 being the best possible voice).  Improvement is such that he does not feel the need to practice on a regular basis which is understandable.  The value of recalibration with therapy techniques both for gauging voice over time as well as for offsetting gradual degradation as Botox wanes was discussed.  Patient reported good understanding.    PLAN: Chad will proceed independently at this time with no additional speech therapy required provided there is no notable change in symptoms beyond the natural variants associated with the Botox cycle.    For practice goals see AVS.     TOTAL SERVICE TIME: 40 minutes  TREATMENT (23106)  NO CHARGE FACILITY FEE (35223)    Today's session and note was completed in tandem with Marito Yadav,  clinician in speech language hearing sciences. I was present during today's appointment and have reviewed / agree with the contents of this note.    Wyatt Oneal M.M., M.A., CCC-SLP  Speech-Language Pathologist  Certificate of Vocology  [phone]    *this report was created in part through the use of computerized dictation software, and though reviewed following completion, some typographic errors may persist.  If there is confusion regarding any of this notes contents, please contact me for clarification.*    Patient Supplied Answers To Last 2 VHI Questionnaires  Voice Handicap Index (VHI-10) 7/28/2022 10/9/2022   My voice makes it difficult for people to hear me 3 2   People have difficulty understanding me in a noisy room 3 2   My voice difficulties restrict my personal and social life.  3 2   I feel left out of conversations because of my voice 2 2   My voice problem causes me to lose income 0 0   I feel as though I have to strain to produce voice 3 2   The clarity of my voice is unpredictable 3 2   My  "voice problem upsets me 2 2   My voice makes me feel handicapped 2 2   People ask, \"What's wrong with your voice?\" 2 2   VHI-10 23 18        Patient Supplied Answers To Last 2 CSI Questionnaires  Cough Severity Index (CSI) 6/8/2022   My cough is worse when I lie down 0   My coughing problem causes me to restrict my personal and social life 0   I tend to avoid places because of my cough problem 0   I feel embarrassed because of my coughing problem 0   People ask, ''What's wrong?'' because I cough a lot 0   I run out of air when I cough 0   My coughing problem affects my voice 0   My coughing problem limits my physical activity 0   My coughing problem upsets me 0   People ask me if I am sick because I cough a lot 0   CSI Score 0        Patient Supplied Answers To Last 2 EAT Questionnaires  Eating Assessment Tool (EAT-10) 6/8/2022   My swallowing problem has caused me to lose weight 0   My swallowing problem interferes with my ability to go out for meals 0   Swallowing liquids takes extra effort 0   Swallowing solids takes extra effort 0   Swallowing pills takes extra effort 0   Swallowing is painful 0   The pleasure of eating is affected by my swallowing 0   When I swallow food sticks in my throat 0   I cough when I eat 0   Swallowing is stressful 0   EAT-10 0        Patient Supplied Answers to Dyspnea Index Questionnaire:  No flowsheet data found.        Again, thank you for allowing me to participate in the care of your patient.      Sincerely,    Robbin Oneal, SLP      "

## 2022-11-17 NOTE — PROGRESS NOTES
"Dennys Odom is a 61 year old male who is being evaluated via a billable video visit.      The patient has been notified and verbally consented to the following:     This video visit will be conducted between you and your provider.    Patient has opted to conduct today's video visit vs an in-person appointment, and is not able to attend due to possible exposure to COVID-19.      If during the course of the call the provider feels a video visit is not appropriate, you will not be charged for this service.    Call initiated at: 9:00  Type of Video Platform Used: [employer]  Location of provider: Residence  Location of patient: Residence    [facility] VOICE CLINIC  THERAPY NOTE (CPT 84457)    Patient: Dennys Odom  Date of Service: 11/17/2022  Referring physician: Dr. Christianson  Impressions from most recent evaluation by Robbin Oneal CCC-SLP on 6/14/2022:  \"Dennys Odom is presenting today with voice changes that began 3 to 4 years ago without inciting incident.  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 Laryngeal evaluation shows severe four-way constrictive supraglottic hyperfunction present across many vocal tasks but worsening in a spastic-like manner on voiced initial sounds.  Across numerous repetitions and variations in order patient consistently reported increased effort on voiced initial versus voiceless initial loaded stimuli.  These findings are consistent with perceptual evaluation which is dominated by strain with intermittent exacerbations on voiced sounds.  Patient was stimulable for improvement with focus on increasing respiratory flow but moments of strain are clearly still appreciated.\"    SUBJECTIVE:  Since the patient's last session, they report the following:     Overall symptoms are " "improved    Continuing to improve currently rates his voice and 9 out of 10    OBJECTIVE:  PATIENT REPORTED MEASURES:  Patient Specific Goal Metrics:  Dysponia SLP Goals 8/10/2022 10/12/2022 11/17/2022   How would you rate your speaking voice quality, if 0 is worst voice quality, and 10 is best voice? 4 8 9   How much effort is it to speak, if 0 is no extra effort and 10 is maximum effort? 5 1 1   How much does your voice problem bother you? Somewhat A little bit Not at all       *see end of note for standardized measures*    THERAPEUTIC ACTIVITIES    Counseling and Education:    Patient had questions about Botox via laryngeal EMG versus as his previous injection was completed.  Answered to the best of my ability    Exercises to promote improved phonatory respiratory coordination    Semioccluded vocal tract exercises reviewed    Alternatives to straw phonation provided    Good accuracy with /w/ phoneme as well as raspberry    High flow contexts    [place] speech (counting) utilized to good effect    Mild intermittent strain appreciated but patient was able to reduce this with concentration on \"connecting the dots between words\"    Reminder about but formed through the use of everyday phrases encouraged      A regimen for home practice was instructed.    I provided handouts of today's therapeutic activities to facilitate practice.    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 Chad feels he is continuing to get excellent benefit from his Botox rating his voice is a 9 out of 10 (with 10 being the best possible voice).  Improvement is such that he does not feel the need to practice on a regular basis which is understandable.  The " "value of recalibration with therapy techniques both for gauging voice over time as well as for offsetting gradual degradation as Botox wanes was discussed.  Patient reported good understanding.    PLAN: Chad will proceed independently at this time with no additional speech therapy required provided there is no notable change in symptoms beyond the natural variants associated with the Botox cycle.    For practice goals see AVS.     TOTAL SERVICE TIME: 40 minutes  TREATMENT (34771)  NO CHARGE FACILITY FEE (32985)    Today's session and note was completed in tandem with Marito Yadav,  clinician in speech language hearing sciences. I was present during today's appointment and have reviewed / agree with the contents of this note.    Wyatt Oneal M.M., M.A., CCC-SLP  Speech-Language Pathologist  Certificate of Vocology  [phone]    *this report was created in part through the use of computerized dictation software, and though reviewed following completion, some typographic errors may persist.  If there is confusion regarding any of this notes contents, please contact me for clarification.*    Patient Supplied Answers To Last 2 VHI Questionnaires  Voice Handicap Index (VHI-10) 7/28/2022 10/9/2022   My voice makes it difficult for people to hear me 3 2   People have difficulty understanding me in a noisy room 3 2   My voice difficulties restrict my personal and social life.  3 2   I feel left out of conversations because of my voice 2 2   My voice problem causes me to lose income 0 0   I feel as though I have to strain to produce voice 3 2   The clarity of my voice is unpredictable 3 2   My voice problem upsets me 2 2   My voice makes me feel handicapped 2 2   People ask, \"What's wrong with your voice?\" 2 2   VHI-10 23 18        Patient Supplied Answers To Last 2 CSI Questionnaires  Cough Severity Index (CSI) 6/8/2022   My cough is worse when I lie down 0   My coughing problem causes me to " restrict my personal and social life 0   I tend to avoid places because of my cough problem 0   I feel embarrassed because of my coughing problem 0   People ask, ''What's wrong?'' because I cough a lot 0   I run out of air when I cough 0   My coughing problem affects my voice 0   My coughing problem limits my physical activity 0   My coughing problem upsets me 0   People ask me if I am sick because I cough a lot 0   CSI Score 0        Patient Supplied Answers To Last 2 EAT Questionnaires  Eating Assessment Tool (EAT-10) 6/8/2022   My swallowing problem has caused me to lose weight 0   My swallowing problem interferes with my ability to go out for meals 0   Swallowing liquids takes extra effort 0   Swallowing solids takes extra effort 0   Swallowing pills takes extra effort 0   Swallowing is painful 0   The pleasure of eating is affected by my swallowing 0   When I swallow food sticks in my throat 0   I cough when I eat 0   Swallowing is stressful 0   EAT-10 0        Patient Supplied Answers to Dyspnea Index Questionnaire:  No flowsheet data found.

## 2022-12-13 ENCOUNTER — OFFICE VISIT (OUTPATIENT)
Dept: OTOLARYNGOLOGY | Facility: CLINIC | Age: 62
End: 2022-12-13
Payer: COMMERCIAL

## 2022-12-13 VITALS — BODY MASS INDEX: 28.44 KG/M2 | HEIGHT: 72 IN | WEIGHT: 210 LBS

## 2022-12-13 DIAGNOSIS — R49.0 DYSPHONIA: Primary | ICD-10-CM

## 2022-12-13 PROCEDURE — 99207 PR NO CHARGE LOS: CPT | Performed by: OTOLARYNGOLOGY

## 2022-12-13 ASSESSMENT — PAIN SCALES - GENERAL: PAINLEVEL: NO PAIN (0)

## 2022-12-13 NOTE — PROGRESS NOTES
[facility]   at the [facility]   Otolaryngology Clinic     Patient: Dennys Odom    MRN: [id number]    : 1960    Age/Gender: 62 year old male  Date of Service: 2022  Rendering Provider:   Kathi Christianson MD     Chief Complaint   Dysphonia  S/p TA botox injection 22  Interval History   HISTORY OF PRESENT ILLNESS: Mr. Odom is a 61 year old male is being followed for dysphonia. he was initially seen on 2022. Please refer to this note for full history.    Today, he presents for follow up. He reports:  - last time, had some difficulty with liquids  - had to concentrate when drinking  - has been able to communicate  - but now starting to get scratchy  - wife also noticed this   - throat clearing here, has noticed this before  - did not work on this much in therapy    Date of injection   location/amount injected  breathiness kicked in symptoms settled  symptoms started to wear off    22 1.0 unit(s) b/l TA Day 2, had coughing with liquids 4 weeks with liquids Not yet, week 7 is going well today       PAST MEDICAL HISTORY:   Past Medical History:   Diagnosis Date     Atrial fibrillation (H)      Chronic maxillary sinusitis      Hyperlipidemia      Right knee pain      Snoring        PAST SURGICAL HISTORY:   Past Surgical History:   Procedure Laterality Date     LARYNGOSCOPY, FLEXIBLE WITH INJECTION N/A 2022    Procedure: LARYNGOSCOPY, FLEXIBLE WITH INJECTION OF BOTOX;  Surgeon: Kathi Christianson MD;  Location: [facility] OR       CURRENT MEDICATIONS:   Current Outpatient Medications:      ibuprofen (ADVIL) 200 MG capsule, Take 200 mg by mouth every 4 hours as needed for fever, Disp: , Rfl:     ALLERGIES: Penicillins    SOCIAL HISTORY:    Social History     Socioeconomic History     Marital status:      Spouse name: Not on file     Number of children: Not on file     Years of education: Not on file     Highest education level: Not on file   Occupational  History     Not on file   Tobacco Use     Smoking status: Never     Smokeless tobacco: Former   Substance and Sexual Activity     Alcohol use: Yes     Comment: a couple beers on weekends     Drug use: Not on file     Sexual activity: Not on file   Other Topics Concern     Not on file   Social History Narrative     Not on file     Social Determinants of Health     Financial Resource Strain: Not on file   Food Insecurity: Not on file   Transportation Needs: Not on file   Physical Activity: Not on file   Stress: Not on file   Social Connections: Not on file   Intimate Partner Violence: Not on file   Housing Stability: Not on file         FAMILY HISTORY:   Family History   Problem Relation Age of Onset     No Known Problems Mother      Hypertension Father      Obesity Father      No Known Problems Sister      No Known Problems Sister      No Known Problems Sister      No Known Problems Sister       Non-contributory for problems with anesthesia    REVIEW OF SYSTEMS:   The patient was asked a 14 point review of systems regarding constitutional symptoms, eye symptoms, ears, nose, mouth, throat symptoms, cardiovascular symptoms, respiratory symptoms, gastrointestinal symptoms, genitourinary symptoms, musculoskeletal symptoms, integumentary symptoms, neurological symptoms, psychiatric symptoms, endocrine symptoms, hematologic/lymphatic symptoms, and allergic/ immunologic symptoms.   The pertinent factors have been included in the HPI and below.  Patient Supplied Answers to Review of Systems   ENT ROS 10/4/2022   Ears, Nose, Throat: Hearing loss, Ringing/noise in ears       Physical Examination   The patient underwent a physical examination as described below. The pertinent positive and negative findings are summarized after the description of the examination.  Constitutional: The patient's developmental and nutritional status was assessed. The patient's voice quality was assessed.  Head and Face: The head and face were  inspected for deformities. The sinuses were palpated. The salivary glands were palpated. Facial muscle strength was assessed bilaterally.  Eyes: Extraocular movements and primary gaze alignment were assessed.  Ears, Nose, Mouth and Throat: The ears and nose were examined for deformities. The nasal septum, mucosa, and turbinates were inspected by anterior rhinoscopy. The lips, teeth, and gums were examined for abnormalities. The oral mucosa, tongue, palate, tonsils, lateral and posterior pharynx were inspected for the presence of asymmetry or mucosal lesions.    Neck: The tracheal position was noted, and the neck mass palpated to determine if there were any asymmetries, abnormal neck masses, thyromegally, or thyroid nodules.  Respiratory: The nature of the breathing and chest expansion/symmetry was observed.  Cardiovascular: The patient was examined to determine the presence of any edema or jugular venous distension.  Abdomen: The contour of the abdomen was noted.  Lymphatic: The patient was examined for infraclavicular lymphadenopathy.  Musculoskeletal: The patient was inspected for the presence of skeletal deformities.  Extremities: The extremities were examined for any clubbing or cyanosis.  Skin: The skin was examined for inflammatory or neoplastic conditions.  Neurologic: The patient's orientation, mood, and affect were noted. The cranial nerve  functions were examined.  Other pertinent positive and negative findings on physical examination:   OC/OP: no lesions, uvula midline, soft palate elevates symmetrically   Neck: no lesions, no TH tenderness to palpation  Throat clearing throughout the visit  All other physical examination findings were within normal limits and noncontributory.    Procedures   None today    Review of Relevant Clinical Data   I personally reviewed:  Notes:   11/17/22 visit with Robbin Oneal M.M. 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 Chad feels he is continuing to get excellent benefit from his Botox rating his voice is a 9 out of 10 (with 10 being the best possible voice).  Improvement is such that he does not feel the need to practice on a regular basis which is understandable.  The value of recalibration with therapy techniques both for gauging voice over time as well as for offsetting gradual degradation as Botox wanes was discussed.  Patient reported good understanding.     PLAN: Chad will proceed independently at this time with no additional speech therapy required provided there is no notable change in symptoms beyond the natural variants associated with the Botox cycle.      Labs:  No results found for: TSH  Lab Results   Component Value Date    .0 11/20/2018    BUN 14.0 11/20/2018     No results found for: WBC, HGB, HCT, MCV, PLT  No results found for: PT, PTT, INR  No results found for: DERIK  No components found for: RHEUMATOIDFACTOR,  RF  No results found for: CRP  No components found for: CKTOT, URICACID  No components found for: C3, C4, DSDNAAB, NDNAABIFA  No results found for: MPOAB    Patient reported Quality of Life (QOL) Measures   Patient Supplied Answers To VHI Questionnaire  Voice Handicap Index (VHI-10) 10/9/2022   My voice makes it difficult for people to hear me 2   People have difficulty understanding me in a noisy room 2   My voice difficulties restrict my personal and social life.  2   I feel left out of conversations because of my voice 2   My voice problem causes me to lose income 0   I feel as though I have to strain to produce voice 2   The clarity of my voice is unpredictable 2   My voice problem upsets me 2   My voice makes me feel handicapped 2   People ask, \"What's wrong with your voice?\" 2   VHI-10 18         Patient Supplied Answers To EAT " Questionnaire  Eating Assessment Tool (EAT-10) 2022   My swallowing problem has caused me to lose weight 0   My swallowing problem interferes with my ability to go out for meals 0   Swallowing liquids takes extra effort 0   Swallowing solids takes extra effort 0   Swallowing pills takes extra effort 0   Swallowing is painful 0   The pleasure of eating is affected by my swallowing 0   When I swallow food sticks in my throat 0   I cough when I eat 0   Swallowing is stressful 0   EAT-10 0         Patient Supplied Answers To CSI Questionnaire  Cough Severity Index (CSI) 2022   My cough is worse when I lie down 0   My coughing problem causes me to restrict my personal and social life 0   I tend to avoid places because of my cough problem 0   I feel embarrassed because of my coughing problem 0   People ask, ''What's wrong?'' because I cough a lot 0   I run out of air when I cough 0   My coughing problem affects my voice 0   My coughing problem limits my physical activity 0   My coughing problem upsets me 0   People ask me if I am sick because I cough a lot 0   CSI Score 0         Patient Supplied Answers to Dyspnea Index Questionnaire:  No flowsheet data found.    Impression & Plan     IMPRESSION: Mr. Odom is a 62 year old male who is being seen for the followin. Dysphonia  - noticed hoarse voice 3-4 years ago unknown cause  - voice becomes tight and cuts off intermittently  - able to get short phrases out  - worse when breathing cold air in  - has been seen at other places and was told things look healthy structurally  - Dr. 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 (voice), M.A., CCC/SLP  - then underwent trial botox injection to both TAs, 1u on 9/27/22  - symptoms 10/4/22 - breathiness kicked in on Saturday, has had some coughing if he chugs liquids and if he has carbonate drinks  - discussed keeping a log of his symptoms  - symptoms 11/15/2022 are improved  - happy with voice  - no trouble swallowing since week 4 post injection  - recommended repeat injection after effects wear off, consider reduced dose  - discussed option of doing EMG botox - will keep with transnasal approach  - symptoms 12/13/2022 are stable voice overall with mild scratchiness  - Botox injection was postponed as patient continues to have benefit, though slight decrement   - discussed keeping a log   Plan  - voice log  - will plan for 1 unit bilaterally to really mimic the transnasal injection    RETURN VISIT: 1/3/23 at 10 AM    Kathi Christianson MD    Laryngology    [facility]  Department of  Otolaryngology - Head and Neck Surgery  Clinics & Surgery Center  [address] [place] [address]  Appointment line: [phone]  Fax: [phone]  [email or web address]     Suma MEJIA, am serving as a scribe to document services personally performed by Kathi Christianson MD at this visit, based upon the provider's statements to me. All documentation has been reviewed by the aforementioned provider prior to being entered into the official medical record.

## 2022-12-13 NOTE — LETTER
2022       RE: Dennys Odom  [address] [address]     Dear Colleague,    Thank you for referring your patient, Dennys Odom, to the [facility] [place] at [facility]. Please see a copy of my visit note below.        [facility]   at the [facility]   Otolaryngology Clinic     Patient: Dennys Odom    MRN: [id number]    : 1960    Age/Gender: 62 year old male  Date of Service: 2022  Rendering Provider:   Kathi Christianson MD     Chief Complaint   Dysphonia  S/p TA botox injection 22  Interval History   HISTORY OF PRESENT ILLNESS: Mr. Odom is a 61 year old male is being followed for dysphonia. he was initially seen on 2022. Please refer to this note for full history.    Today, he presents for follow up. He reports:  - last time, had some difficulty with liquids  - had to concentrate when drinking  - has been able to communicate  - but now starting to get scratchy  - wife also noticed this   - throat clearing here, has noticed this before  - did not work on this much in therapy    Date of injection   location/amount injected  breathiness kicked in symptoms settled  symptoms started to wear off    22 1.0 unit(s) b/l TA Day 2, had coughing with liquids 4 weeks with liquids Not yet, week 7 is going well today       PAST MEDICAL HISTORY:   Past Medical History:   Diagnosis Date     Atrial fibrillation (H)      Chronic maxillary sinusitis      Hyperlipidemia      Right knee pain      Snoring        PAST SURGICAL HISTORY:   Past Surgical History:   Procedure Laterality Date     LARYNGOSCOPY, FLEXIBLE WITH INJECTION N/A 2022    Procedure: LARYNGOSCOPY, FLEXIBLE WITH INJECTION OF BOTOX;  Surgeon: Kathi Christianson MD;  Location: [facility] OR       CURRENT MEDICATIONS:   Current Outpatient Medications:      ibuprofen (ADVIL) 200 MG capsule, Take 200  mg by mouth every 4 hours as needed for fever, Disp: , Rfl:     ALLERGIES: Penicillins    SOCIAL HISTORY:    Social History     Socioeconomic History     Marital status:      Spouse name: Not on file     Number of children: Not on file     Years of education: Not on file     Highest education level: Not on file   Occupational History     Not on file   Tobacco Use     Smoking status: Never     Smokeless tobacco: Former   Substance and Sexual Activity     Alcohol use: Yes     Comment: a couple beers on weekends     Drug use: Not on file     Sexual activity: Not on file   Other Topics Concern     Not on file   Social History Narrative     Not on file     Social Determinants of Health     Financial Resource Strain: Not on file   Food Insecurity: Not on file   Transportation Needs: Not on file   Physical Activity: Not on file   Stress: Not on file   Social Connections: Not on file   Intimate Partner Violence: Not on file   Housing Stability: Not on file         FAMILY HISTORY:   Family History   Problem Relation Age of Onset     No Known Problems Mother      Hypertension Father      Obesity Father      No Known Problems Sister      No Known Problems Sister      No Known Problems Sister      No Known Problems Sister       Non-contributory for problems with anesthesia    REVIEW OF SYSTEMS:   The patient was asked a 14 point review of systems regarding constitutional symptoms, eye symptoms, ears, nose, mouth, throat symptoms, cardiovascular symptoms, respiratory symptoms, gastrointestinal symptoms, genitourinary symptoms, musculoskeletal symptoms, integumentary symptoms, neurological symptoms, psychiatric symptoms, endocrine symptoms, hematologic/lymphatic symptoms, and allergic/ immunologic symptoms.   The pertinent factors have been included in the HPI and below.  Patient Supplied Answers to Review of Systems   ENT ROS 10/4/2022   Ears, Nose, Throat: Hearing loss, Ringing/noise in ears       Physical Examination    The patient underwent a physical examination as described below. The pertinent positive and negative findings are summarized after the description of the examination.  Constitutional: The patient's developmental and nutritional status was assessed. The patient's voice quality was assessed.  Head and Face: The head and face were inspected for deformities. The sinuses were palpated. The salivary glands were palpated. Facial muscle strength was assessed bilaterally.  Eyes: Extraocular movements and primary gaze alignment were assessed.  Ears, Nose, Mouth and Throat: The ears and nose were examined for deformities. The nasal septum, mucosa, and turbinates were inspected by anterior rhinoscopy. The lips, teeth, and gums were examined for abnormalities. The oral mucosa, tongue, palate, tonsils, lateral and posterior pharynx were inspected for the presence of asymmetry or mucosal lesions.    Neck: The tracheal position was noted, and the neck mass palpated to determine if there were any asymmetries, abnormal neck masses, thyromegally, or thyroid nodules.  Respiratory: The nature of the breathing and chest expansion/symmetry was observed.  Cardiovascular: The patient was examined to determine the presence of any edema or jugular venous distension.  Abdomen: The contour of the abdomen was noted.  Lymphatic: The patient was examined for infraclavicular lymphadenopathy.  Musculoskeletal: The patient was inspected for the presence of skeletal deformities.  Extremities: The extremities were examined for any clubbing or cyanosis.  Skin: The skin was examined for inflammatory or neoplastic conditions.  Neurologic: The patient's orientation, mood, and affect were noted. The cranial nerve  functions were examined.  Other pertinent positive and negative findings on physical examination:   OC/OP: no lesions, uvula midline, soft palate elevates symmetrically   Neck: no lesions, no TH tenderness to palpation  Throat clearing  throughout the visit  All other physical examination findings were within normal limits and noncontributory.    Procedures   None today    Review of Relevant Clinical Data   I personally reviewed:  Notes:   11/17/22 visit with Robbin Oneal M.M. 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 Chad feels he is continuing to get excellent benefit from his Botox rating his voice is a 9 out of 10 (with 10 being the best possible voice).  Improvement is such that he does not feel the need to practice on a regular basis which is understandable.  The value of recalibration with therapy techniques both for gauging voice over time as well as for offsetting gradual degradation as Botox wanes was discussed.  Patient reported good understanding.     PLAN: Chad will proceed independently at this time with no additional speech therapy required provided there is no notable change in symptoms beyond the natural variants associated with the Botox cycle.      Labs:  No results found for: TSH  Lab Results   Component Value Date    .0 11/20/2018    BUN 14.0 11/20/2018     No results found for: WBC, HGB, HCT, MCV, PLT  No results found for: PT, PTT, INR  No results found for: DERIK  No components found for: RHEUMATOIDFACTOR,  RF  No results found for: CRP  No components found for: CKTOT, URICACID  No components found for: C3, C4, DSDNAAB, NDNAABIFA  No results found for: MPOAB    Patient reported Quality of Life (QOL) Measures   Patient Supplied Answers To VHI Questionnaire  Voice Handicap Index (VHI-10) 10/9/2022   My voice makes it difficult for people to hear me 2   People have difficulty understanding me in a noisy room 2   My voice difficulties restrict my personal and social life.  2   I feel left out of conversations because of  "my voice 2   My voice problem causes me to lose income 0   I feel as though I have to strain to produce voice 2   The clarity of my voice is unpredictable 2   My voice problem upsets me 2   My voice makes me feel handicapped 2   People ask, \"What's wrong with your voice?\" 2   VHI-10 18         Patient Supplied Answers To EAT Questionnaire  Eating Assessment Tool (EAT-10) 2022   My swallowing problem has caused me to lose weight 0   My swallowing problem interferes with my ability to go out for meals 0   Swallowing liquids takes extra effort 0   Swallowing solids takes extra effort 0   Swallowing pills takes extra effort 0   Swallowing is painful 0   The pleasure of eating is affected by my swallowing 0   When I swallow food sticks in my throat 0   I cough when I eat 0   Swallowing is stressful 0   EAT-10 0         Patient Supplied Answers To CSI Questionnaire  Cough Severity Index (CSI) 2022   My cough is worse when I lie down 0   My coughing problem causes me to restrict my personal and social life 0   I tend to avoid places because of my cough problem 0   I feel embarrassed because of my coughing problem 0   People ask, ''What's wrong?'' because I cough a lot 0   I run out of air when I cough 0   My coughing problem affects my voice 0   My coughing problem limits my physical activity 0   My coughing problem upsets me 0   People ask me if I am sick because I cough a lot 0   CSI Score 0         Patient Supplied Answers to Dyspnea Index Questionnaire:  No flowsheet data found.    Impression & Plan     IMPRESSION: Mr. Odom is a 62 year old male who is being seen for the followin. Dysphonia  - noticed hoarse voice 3-4 years ago unknown cause  - voice becomes tight and cuts off intermittently  - able to get short phrases out  - worse when breathing cold air in  - has been seen at other places and was told things look healthy structurally  - Dr. 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 (voice), M.A., CCC/SLP  - then underwent trial botox injection to both TAs, 1u on 9/27/22  - symptoms 10/4/22 - breathiness kicked in on Saturday, has had some coughing if he chugs liquids and if he has carbonate drinks  - discussed keeping a log of his symptoms  - symptoms 11/15/2022 are improved  - happy with voice  - no trouble swallowing since week 4 post injection  - recommended repeat injection after effects wear off, consider reduced dose  - discussed option of doing EMG botox - will keep with transnasal approach  - symptoms 12/13/2022 are stable voice overall with mild scratchiness  - Botox injection was postponed as patient continues to have benefit, though slight decrement   - discussed keeping a log   Plan  - voice log  - will plan for 1 unit bilaterally to really mimic the transnasal injection    RETURN VISIT: 1/3/23 at 10 AM    Kathi Christianson MD    Laryngology    [facility]  Department of  Otolaryngology - Head and Neck Surgery  Clinics & Surgery Center  [address] [place] [id number]  Appointment line: [phone]  Fax:  [phone]  [email or web address]     I, Suma Rubin, am serving as a scribe to document services personally performed by Kathi Christianson MD at this visit, based upon the provider's statements to me. All documentation has been reviewed by the aforementioned provider prior to being entered into the official medical record.         Again, thank you for allowing me to participate in the care of your patient.      Sincerely,    Kathi Christianson MD

## 2022-12-13 NOTE — PATIENT INSTRUCTIONS
1.  You were seen in the ENT Clinic today by . If you have any questions or concerns after your appointment, please call [phone]. Press option #1 for scheduling related needs. Press option #3 for Nurse advice.    2.   Plan is to return to clinic 1/3/23 at 10:00 am for botox injection      Johanna Coelho LPN  [phone]  [facility] - Otolaryngology

## 2023-01-25 NOTE — PROGRESS NOTES
[facility]   at the [facility]   Otolaryngology Clinic     Patient: Dennys Odom    MRN: [id number]    : 1960    Age/Gender: 62 year old male  Date of Service: 2023  Rendering Provider:   aKthi Christianson MD     Chief Complaint   Dysphonia  S/p TA botox injection 22  Interval History   HISTORY OF PRESENT ILLNESS: Mr. Odom is a 61 year old male is being followed for dysphonia. He was initially seen on 2022. Please refer to this note for full history.     Today, he presents for follow up. He reports:     Date of injection   location/amount injected  breathiness kicked in symptoms settled  symptoms started to wear off    22 1.0 unit(s) b/l TA Day 2, had coughing with liquids 4 weeks with liquids Not yet, week 7 is going well today  Wife noted voice straining 1.5 to 2 weeks ago   22 1.0 unit(s) b/l TA           PAST MEDICAL HISTORY:   Past Medical History:   Diagnosis Date     Atrial fibrillation (H)      Chronic maxillary sinusitis      Hyperlipidemia      Right knee pain      Snoring        PAST SURGICAL HISTORY:   Past Surgical History:   Procedure Laterality Date     LARYNGOSCOPY, FLEXIBLE WITH INJECTION N/A 2022    Procedure: LARYNGOSCOPY, FLEXIBLE WITH INJECTION OF BOTOX;  Surgeon: Kathi Christianson MD;  Location: [facility] OR       CURRENT MEDICATIONS:   Current Outpatient Medications:      ibuprofen (ADVIL) 200 MG capsule, Take 200 mg by mouth every 4 hours as needed for fever, Disp: , Rfl:     ALLERGIES: Penicillins    SOCIAL HISTORY:    Social History     Socioeconomic History     Marital status:      Spouse name: Not on file     Number of children: Not on file     Years of education: Not on file     Highest education level: Not on file   Occupational History     Not on file   Tobacco Use     Smoking status: Never     Smokeless tobacco: Former   Substance and Sexual Activity     Alcohol use: Yes     Comment: a couple beers on weekends      Drug use: Not on file     Sexual activity: Not on file   Other Topics Concern     Not on file   Social History Narrative     Not on file     Social Determinants of Health     Financial Resource Strain: Not on file   Food Insecurity: Not on file   Transportation Needs: Not on file   Physical Activity: Not on file   Stress: Not on file   Social Connections: Not on file   Intimate Partner Violence: Not on file   Housing Stability: Not on file         FAMILY HISTORY:   Family History   Problem Relation Age of Onset     No Known Problems Mother      Hypertension Father      Obesity Father      No Known Problems Sister      No Known Problems Sister      No Known Problems Sister      No Known Problems Sister       Non-contributory for problems with anesthesia    REVIEW OF SYSTEMS:   The patient was asked a 14 point review of systems regarding constitutional symptoms, eye symptoms, ears, nose, mouth, throat symptoms, cardiovascular symptoms, respiratory symptoms, gastrointestinal symptoms, genitourinary symptoms, musculoskeletal symptoms, integumentary symptoms, neurological symptoms, psychiatric symptoms, endocrine symptoms, hematologic/lymphatic symptoms, and allergic/ immunologic symptoms.   The pertinent factors have been included in the HPI and below.  Patient Supplied Answers to Review of Systems   ENT ROS 10/4/2022   Ears, Nose, Throat: Hearing loss, Ringing/noise in ears       Physical Examination   The patient underwent a physical examination as described below. The pertinent positive and negative findings are summarized after the description of the examination.  Constitutional: The patient's developmental and nutritional status was assessed. The patient's voice quality was assessed.  Head and Face: The head and face were inspected for deformities. The sinuses were palpated. The salivary glands were palpated. Facial muscle strength was assessed bilaterally.  Eyes: Extraocular movements and primary gaze alignment  were assessed.  Ears, Nose, Mouth and Throat: The ears and nose were examined for deformities. The nasal septum, mucosa, and turbinates were inspected by anterior rhinoscopy. The lips, teeth, and gums were examined for abnormalities. The oral mucosa, tongue, palate, tonsils, lateral and posterior pharynx were inspected for the presence of asymmetry or mucosal lesions.    Neck: The tracheal position was noted, and the neck mass palpated to determine if there were any asymmetries, abnormal neck masses, thyromegally, or thyroid nodules.  Respiratory: The nature of the breathing and chest expansion/symmetry was observed.  Cardiovascular: The patient was examined to determine the presence of any edema or jugular venous distension.  Abdomen: The contour of the abdomen was noted.  Lymphatic: The patient was examined for infraclavicular lymphadenopathy.  Musculoskeletal: The patient was inspected for the presence of skeletal deformities.  Extremities: The extremities were examined for any clubbing or cyanosis.  Skin: The skin was examined for inflammatory or neoplastic conditions.  Neurologic: The patient's orientation, mood, and affect were noted. The cranial nerve  functions were examined.  Other pertinent positive and negative findings on physical examination:   OC/OP: no lesions, uvula midline, soft palate elevates symmetrically   Neck: no lesions, no TH tenderness to palpation  All other physical examination findings were within normal limits and noncontributory.    Procedures     Chemodenervation of the Larynx - Botulinum Toxin (CPT 64054)     Pre-procedure diagnosis: Adductor spasmodic dysphonia  Post-procedure diagnosis: same  Indication for procedure: Mr. Odom is a 62 year old year old male with hyperfunctional larynx. Botox injections at regular intervals are routine therapy for hyperfunctional larynx such as spasmodic dysphonia, laryngospasm, chronic cough, paradoxical vocal fold motion, laryngeal  "synkinesis.  Procedure(s): Injection of Botox into the Each Thyroarytenoid Muscle  Details of Procedure: After informed consent was obtained, the patient was seated in the examination chair. The area of the thyroid cartilage, cricothyroid space and vocal folds were identified as the external neck landmarks.  The area was cleaned with alcohol and a 27 gauge needle was then used to inject botulinum toxin percutaneously. The needle was passed transcricothyroid into the larynx.   Anesthesia type: none or  topical 4% lidocaine with 0.25% phenylephrine    Findings:     1.0 units of Botox into the Each thyroarytenoid muscle with EMG guidance    Estimated Blood Loss: none  Complications: None  Disposition: Patient tolerated the procedure well         Review of Relevant Clinical Data   I personally reviewed:  Labs:  No results found for: TSH  Lab Results   Component Value Date    .0 11/20/2018    BUN 14.0 11/20/2018     No results found for: WBC, HGB, HCT, MCV, PLT  No results found for: PT, PTT, INR  No results found for: DERIK  No components found for: RHEUMATOIDFACTOR,  RF  No results found for: CRP  No components found for: CKTOT, URICACID  No components found for: C3, C4, DSDNAAB, NDNAABIFA  No results found for: MPOAB    Patient reported Quality of Life (QOL) Measures   Patient Supplied Answers To VHI Questionnaire  Voice Handicap Index (VHI-10) 10/9/2022   My voice makes it difficult for people to hear me 2   People have difficulty understanding me in a noisy room 2   My voice difficulties restrict my personal and social life.  2   I feel left out of conversations because of my voice 2   My voice problem causes me to lose income 0   I feel as though I have to strain to produce voice 2   The clarity of my voice is unpredictable 2   My voice problem upsets me 2   My voice makes me feel handicapped 2   People ask, \"What's wrong with your voice?\" 2   VHI-10 18         Patient Supplied Answers To EAT " Questionnaire  Eating Assessment Tool (EAT-10) 2022   My swallowing problem has caused me to lose weight 0   My swallowing problem interferes with my ability to go out for meals 0   Swallowing liquids takes extra effort 0   Swallowing solids takes extra effort 0   Swallowing pills takes extra effort 0   Swallowing is painful 0   The pleasure of eating is affected by my swallowing 0   When I swallow food sticks in my throat 0   I cough when I eat 0   Swallowing is stressful 0   EAT-10 0         Patient Supplied Answers To CSI Questionnaire  Cough Severity Index (CSI) 2022   My cough is worse when I lie down 0   My coughing problem causes me to restrict my personal and social life 0   I tend to avoid places because of my cough problem 0   I feel embarrassed because of my coughing problem 0   People ask, ''What's wrong?'' because I cough a lot 0   I run out of air when I cough 0   My coughing problem affects my voice 0   My coughing problem limits my physical activity 0   My coughing problem upsets me 0   People ask me if I am sick because I cough a lot 0   CSI Score 0         Patient Supplied Answers to Dyspnea Index Questionnaire:  No flowsheet data found.    Impression & Plan     IMPRESSION: Mr. Odom is a 62 year old male who is being seen for the followin. Dysphonia  - noticed hoarse voice 3-4 years ago unknown cause  - voice becomes tight and cuts off intermittently  - able to get short phrases out  - worse when breathing cold air in  - has been seen at other places and was told things look healthy structurally  - Dr. 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 (voice), M.A., CCC/SLP  - then underwent trial botox injection to both TAs, 1u on 9/27/22  - symptoms 10/4/22 - breathiness kicked in on Saturday, has had some coughing if he chugs liquids and if he has carbonate drinks  - discussed keeping a log of his symptoms  - symptoms 11/15/2022 are improved  - happy with voice  - no trouble swallowing since week 4 post injection  - recommended repeat injection after effects wear off, consider reduced dose  - discussed option of doing EMG botox - will keep with transnasal approach  - symptoms 12/13/2022 are stable voice overall with mild scratchiness  - Botox injection was postponed as patient continues to have benefit, though slight decrement   - discussed keeping a log   - symptoms 1/25/2023 are voice began straining 1.5 to 2 weeks ago - lasted for 4 months  - s/p 1.0 u Botox b/l TA   - felt like he swallowed some of it  Plan  - will Mychart if there are not changes to voice in 1 week  - going away to [place] for 2 weeks    RETURN VISIT: 3/14/23 at 11 AM, virtual visit    Kathi Christianson MD    Laryngology    [facility]  Department of  Otolaryngology - Head and Neck Surgery  Clinics [place] [address]  Appointment line: [phone]  Fax: [phone]  [email or web address]     ISuma, am serving as a scribe to document services personally performed by Kathi Christianson MD at this visit, based upon the provider's  statements to me. All documentation has been reviewed by the aforementioned provider prior to being entered into the official medical record.

## 2023-01-31 ENCOUNTER — OFFICE VISIT (OUTPATIENT)
Dept: OTOLARYNGOLOGY | Facility: CLINIC | Age: 63
End: 2023-01-31
Payer: COMMERCIAL

## 2023-01-31 DIAGNOSIS — R49.0 DYSPHONIA: Primary | ICD-10-CM

## 2023-01-31 DIAGNOSIS — J38.3 ADDUCTOR SPASMODIC DYSPHONIA: ICD-10-CM

## 2023-01-31 PROCEDURE — 99207 PR NO CHARGE LOS: CPT | Mod: 25 | Performed by: OTOLARYNGOLOGY

## 2023-01-31 PROCEDURE — 64617 CHEMODENER MUSCLE LARYNX EMG: CPT | Mod: 50 | Performed by: OTOLARYNGOLOGY

## 2023-01-31 ASSESSMENT — PAIN SCALES - GENERAL: PAINLEVEL: NO PAIN (0)

## 2023-01-31 NOTE — LETTER
2023       RE: Dennys Odom  [address] [facility] [address]     Dear Colleague,    Thank you for referring your patient, Dennys Odom, to the [facility] [place] at [facility]. Please see a copy of my visit note below.        [facility]   at the [facility]   Otolaryngology Clinic     Patient: Dennys Odom    MRN: [id number]    : 1960    Age/Gender: 62 year old male  Date of Service: 2023  Rendering Provider:   Kathi Christianson MD     Chief Complaint   Dysphonia  S/p TA botox injection 22  Interval History   HISTORY OF PRESENT ILLNESS: Mr. Odom is a 61 year old male is being followed for dysphonia. He was initially seen on 2022. Please refer to this note for full history.     Today, he presents for follow up. He reports:     Date of injection   location/amount injected  breathiness kicked in symptoms settled  symptoms started to wear off    22 1.0 unit(s) b/l TA Day 2, had coughing with liquids 4 weeks with liquids Not yet, week 7 is going well today  Wife noted voice straining 1.5 to 2 weeks ago   22 1.0 unit(s) b/l TA           PAST MEDICAL HISTORY:   Past Medical History:   Diagnosis Date     Atrial fibrillation (H)      Chronic maxillary sinusitis      Hyperlipidemia      Right knee pain      Snoring        PAST SURGICAL HISTORY:   Past Surgical History:   Procedure Laterality Date     LARYNGOSCOPY, FLEXIBLE WITH INJECTION N/A 2022    Procedure: LARYNGOSCOPY, FLEXIBLE WITH INJECTION OF BOTOX;  Surgeon: Kathi Christianson MD;  Location: [facility] OR       CURRENT MEDICATIONS:   Current Outpatient Medications:      ibuprofen (ADVIL) 200 MG capsule, Take 200 mg by mouth every 4 hours as needed for fever, Disp: , Rfl:     ALLERGIES: Penicillins    SOCIAL HISTORY:    Social History     Socioeconomic History     Marital status:       Spouse name: Not on file     Number of children: Not on file     Years of education: Not on file     Highest education level: Not on file   Occupational History     Not on file   Tobacco Use     Smoking status: Never     Smokeless tobacco: Former   Substance and Sexual Activity     Alcohol use: Yes     Comment: a couple beers on weekends     Drug use: Not on file     Sexual activity: Not on file   Other Topics Concern     Not on file   Social History Narrative     Not on file     Social Determinants of Health     Financial Resource Strain: Not on file   Food Insecurity: Not on file   Transportation Needs: Not on file   Physical Activity: Not on file   Stress: Not on file   Social Connections: Not on file   Intimate Partner Violence: Not on file   Housing Stability: Not on file         FAMILY HISTORY:   Family History   Problem Relation Age of Onset     No Known Problems Mother      Hypertension Father      Obesity Father      No Known Problems Sister      No Known Problems Sister      No Known Problems Sister      No Known Problems Sister       Non-contributory for problems with anesthesia    REVIEW OF SYSTEMS:   The patient was asked a 14 point review of systems regarding constitutional symptoms, eye symptoms, ears, nose, mouth, throat symptoms, cardiovascular symptoms, respiratory symptoms, gastrointestinal symptoms, genitourinary symptoms, musculoskeletal symptoms, integumentary symptoms, neurological symptoms, psychiatric symptoms, endocrine symptoms, hematologic/lymphatic symptoms, and allergic/ immunologic symptoms.   The pertinent factors have been included in the HPI and below.  Patient Supplied Answers to Review of Systems   ENT ROS 10/4/2022   Ears, Nose, Throat: Hearing loss, Ringing/noise in ears       Physical Examination   The patient underwent a physical examination as described below. The pertinent positive and negative findings are summarized after the description of the  examination.  Constitutional: The patient's developmental and nutritional status was assessed. The patient's voice quality was assessed.  Head and Face: The head and face were inspected for deformities. The sinuses were palpated. The salivary glands were palpated. Facial muscle strength was assessed bilaterally.  Eyes: Extraocular movements and primary gaze alignment were assessed.  Ears, Nose, Mouth and Throat: The ears and nose were examined for deformities. The nasal septum, mucosa, and turbinates were inspected by anterior rhinoscopy. The lips, teeth, and gums were examined for abnormalities. The oral mucosa, tongue, palate, tonsils, lateral and posterior pharynx were inspected for the presence of asymmetry or mucosal lesions.    Neck: The tracheal position was noted, and the neck mass palpated to determine if there were any asymmetries, abnormal neck masses, thyromegally, or thyroid nodules.  Respiratory: The nature of the breathing and chest expansion/symmetry was observed.  Cardiovascular: The patient was examined to determine the presence of any edema or jugular venous distension.  Abdomen: The contour of the abdomen was noted.  Lymphatic: The patient was examined for infraclavicular lymphadenopathy.  Musculoskeletal: The patient was inspected for the presence of skeletal deformities.  Extremities: The extremities were examined for any clubbing or cyanosis.  Skin: The skin was examined for inflammatory or neoplastic conditions.  Neurologic: The patient's orientation, mood, and affect were noted. The cranial nerve  functions were examined.  Other pertinent positive and negative findings on physical examination:   OC/OP: no lesions, uvula midline, soft palate elevates symmetrically   Neck: no lesions, no TH tenderness to palpation  All other physical examination findings were within normal limits and noncontributory.    Procedures     Chemodenervation of the Larynx - Botulinum Toxin (CPT 01520)      Pre-procedure diagnosis: Adductor spasmodic dysphonia  Post-procedure diagnosis: same  Indication for procedure: Mr. Odom is a 62 year old year old male with hyperfunctional larynx. Botox injections at regular intervals are routine therapy for hyperfunctional larynx such as spasmodic dysphonia, laryngospasm, chronic cough, paradoxical vocal fold motion, laryngeal synkinesis.  Procedure(s): Injection of Botox into the Each Thyroarytenoid Muscle  Details of Procedure: After informed consent was obtained, the patient was seated in the examination chair. The area of the thyroid cartilage, cricothyroid space and vocal folds were identified as the external neck landmarks.  The area was cleaned with alcohol and a 27 gauge needle was then used to inject botulinum toxin percutaneously. The needle was passed transcricothyroid into the larynx.   Anesthesia type: none or  topical 4% lidocaine with 0.25% phenylephrine    Findings:     1.0 units of Botox into the Each thyroarytenoid muscle with EMG guidance    Estimated Blood Loss: none  Complications: None  Disposition: Patient tolerated the procedure well         Review of Relevant Clinical Data   I personally reviewed:  Labs:  No results found for: TSH  Lab Results   Component Value Date    .0 11/20/2018    BUN 14.0 11/20/2018     No results found for: WBC, HGB, HCT, MCV, PLT  No results found for: PT, PTT, INR  No results found for: DERIK  No components found for: RHEUMATOIDFACTOR,  RF  No results found for: CRP  No components found for: CKTOT, URICACID  No components found for: C3, C4, DSDNAAB, NDNAABIFA  No results found for: MPOAB    Patient reported Quality of Life (QOL) Measures   Patient Supplied Answers To VHI Questionnaire  Voice Handicap Index (VHI-10) 10/9/2022   My voice makes it difficult for people to hear me 2   People have difficulty understanding me in a noisy room 2   My voice difficulties restrict my personal and social life.  2   I feel left  "out of conversations because of my voice 2   My voice problem causes me to lose income 0   I feel as though I have to strain to produce voice 2   The clarity of my voice is unpredictable 2   My voice problem upsets me 2   My voice makes me feel handicapped 2   People ask, \"What's wrong with your voice?\" 2   VHI-10 18         Patient Supplied Answers To EAT Questionnaire  Eating Assessment Tool (EAT-10) 2022   My swallowing problem has caused me to lose weight 0   My swallowing problem interferes with my ability to go out for meals 0   Swallowing liquids takes extra effort 0   Swallowing solids takes extra effort 0   Swallowing pills takes extra effort 0   Swallowing is painful 0   The pleasure of eating is affected by my swallowing 0   When I swallow food sticks in my throat 0   I cough when I eat 0   Swallowing is stressful 0   EAT-10 0         Patient Supplied Answers To CSI Questionnaire  Cough Severity Index (CSI) 2022   My cough is worse when I lie down 0   My coughing problem causes me to restrict my personal and social life 0   I tend to avoid places because of my cough problem 0   I feel embarrassed because of my coughing problem 0   People ask, ''What's wrong?'' because I cough a lot 0   I run out of air when I cough 0   My coughing problem affects my voice 0   My coughing problem limits my physical activity 0   My coughing problem upsets me 0   People ask me if I am sick because I cough a lot 0   CSI Score 0         Patient Supplied Answers to Dyspnea Index Questionnaire:  No flowsheet data found.    Impression & Plan     IMPRESSION: Mr. Odom is a 62 year old male who is being seen for the followin. Dysphonia  - noticed hoarse voice 3-4 years ago unknown cause  - voice becomes tight and cuts off intermittently  - able to get short phrases out  - worse when breathing cold air in  - has been seen at other places and was told things look healthy structurally  - Dr. 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 (voice)KAVITHAAPelon, CCC/SLP  - then underwent trial botox injection to both TAs, 1u on 9/27/22  - symptoms 10/4/22 - breathiness kicked in on Saturday, has had some coughing if he chugs liquids and if he has carbonate drinks  - discussed keeping a log of his symptoms  - symptoms 11/15/2022 are improved  - happy with voice  - no trouble swallowing since week 4 post injection  - recommended repeat injection after effects wear off, consider reduced dose  - discussed option of doing EMG botox - will keep with transnasal approach  - symptoms 12/13/2022 are stable voice overall with mild scratchiness  - Botox injection was postponed as patient continues to have benefit, though slight decrement   - discussed keeping a log   - symptoms 1/25/2023 are voice began straining 1.5 to 2 weeks ago - lasted for 4 months  - s/p 1.0 u Botox b/l TA   - felt like he swallowed some of it  Plan  - will Mychart if there are not changes to voice in 1 week  - going away to [place] for 2 weeks    RETURN VISIT: 3/14/23 at 11 AM, virtual visit    Kathi Christianson MD    Laryngology    [facility]  Department of   Otolaryngology - Head and Neck Surgery  Clinics & Surgery Center  [address] [place] [id number]  Appointment line: [phone]  Fax: [phone]  [email or web address]     I, Suma Rubin, am serving as a scribe to document services personally performed by Kathi Christianson MD at this visit, based upon the provider's statements to me. All documentation has been reviewed by the aforementioned provider prior to being entered into the official medical record.       Again, thank you for allowing me to participate in the care of your patient.      Sincerely,    Kathi Christianson MD

## 2023-01-31 NOTE — PATIENT INSTRUCTIONS
1.  You were seen in the ENT Clinic today by . If you have any questions or concerns after your appointment, please call [phone]. Press option #1 for scheduling related needs. Press option #3 for Nurse advice.    2.  Plan is for a virtual follow up on 3/14/23 at 11:00 am      Johanna Coelho LPN  [phone]  [facility] - Otolaryngology

## 2023-01-31 NOTE — NURSING NOTE
Invasive Procedure Safety Checklist  Procedure:  botox    Responsible person(s):  Complete sections as appropriate and electronically sign and date below.    Staff/Provider  Consent documentation on chart:  YES  H&P is not applicable (when straight local anesthesia is used).    Procedure Team  Completed by comparing informed consent documentation, information on the patient record and/or the marked surgical site, and discussion with the patient/guardian.     Verified:  (Select all that apply)  Patient identification (two indicators)  Procedure to be performed  Procedure site and /or laterality and/or level  Consent  Procedure site:  Site can not be marked due to location.  Provider Cronin - Site/Laterality/Level:  No Level or Structure  Staff/Provider:  No images    Procedure Team:  *Pause for the Cause* verbal and active participation of team members- verify:  Patient name:  YES  Procedure to be performed:  YES  Site, laterality and level, noting patient position:  YES    Above steps completed as applicable (Electronic Signature, Title, Date):    Johanna Coelho LPN      Note:  Any incidents of wrong patient, wrong procedure, or wrong site are reported using the Occurrence Process already in place.  The occurrence form is required to be completed immediately with this type of event.

## 2023-03-13 NOTE — PROGRESS NOTES
Chad is a 62 year old who is being evaluated via a billable video visit.      How would you like to obtain your AVS? MyChart  If the video visit is dropped, the invitation should be resent by: Text to cell phone: [phone]  Will anyone else be joining your video visit? No        Video-Visit Details    Type of service:  Video Visit     Originating Location (pt. Location): Home    Distant Location (provider location):  On-site  Platform used for Video Visit: AmWell  Visit start: 10:32 AM  Visit end: 10:37 AM      [facility]   at the [facility]   Otolaryngology Clinic     Patient: Dennys Odom    MRN: [id number]    : 1960    Age/Gender: 62 year old male  Date of Service: 3/14/2023  Rendering Provider:   Kathi Christianson MD     Chief Complaint   Dysphonia  S/p TA botox injection 22  Interval History   HISTORY OF PRESENT ILLNESS: Mr. Odom is a 62 year old male is being followed for dysphonia.   he was initially seen on 2022. Please refer to this note for full history.     Today, he presents via video. he reports   - enjoyed trip to [place]  - took several weeks for voice to become normal  - no trouble with swallowing liquids    Date of injection   location/amount injected  breathiness kicked in symptoms settled  symptoms started to wear off    22 1.0 unit(s) b/l TA Day 2, had coughing with liquids 4 weeks with liquids Not yet, week 7 is going well today  Wife noted voice straining 1.5 to 2 weeks ago   23 1.0 unit(s) b/l TA   2 weeks, no problems with liquids           PAST MEDICAL HISTORY:   Past Medical History:   Diagnosis Date     Atrial fibrillation (H)      Chronic maxillary sinusitis      Hyperlipidemia      Right knee pain      Snoring        PAST SURGICAL HISTORY:   Past Surgical History:   Procedure Laterality Date     LARYNGOSCOPY, FLEXIBLE WITH INJECTION N/A 2022    Procedure: LARYNGOSCOPY, FLEXIBLE WITH INJECTION OF BOTOX;  Surgeon: Sammy  MD Kathi;  Location: [facility] OR       CURRENT MEDICATIONS:   Current Outpatient Medications:      ibuprofen (ADVIL) 200 MG capsule, Take 200 mg by mouth every 4 hours as needed for fever, Disp: , Rfl:     ALLERGIES: Penicillins    SOCIAL HISTORY:    Social History     Socioeconomic History     Marital status:      Spouse name: Not on file     Number of children: Not on file     Years of education: Not on file     Highest education level: Not on file   Occupational History     Not on file   Tobacco Use     Smoking status: Never     Smokeless tobacco: Former   Substance and Sexual Activity     Alcohol use: Yes     Comment: a couple beers on weekends     Drug use: Not on file     Sexual activity: Not on file   Other Topics Concern     Not on file   Social History Narrative     Not on file     Social Determinants of Health     Financial Resource Strain: Not on file   Food Insecurity: Not on file   Transportation Needs: Not on file   Physical Activity: Not on file   Stress: Not on file   Social Connections: Not on file   Intimate Partner Violence: Not on file   Housing Stability: Not on file         FAMILY HISTORY:   Family History   Problem Relation Age of Onset     No Known Problems Mother      Hypertension Father      Obesity Father      No Known Problems Sister      No Known Problems Sister      No Known Problems Sister      No Known Problems Sister       Non-contributory for problems with anesthesia    REVIEW OF SYSTEMS:   The patient was asked a 14 point review of systems regarding constitutional symptoms, eye symptoms, ears, nose, mouth, throat symptoms, cardiovascular symptoms, respiratory symptoms, gastrointestinal symptoms, genitourinary symptoms, musculoskeletal symptoms, integumentary symptoms, neurological symptoms, psychiatric symptoms, endocrine symptoms, hematologic/lymphatic symptoms, and allergic/ immunologic symptoms.   The pertinent factors have been included in the HPI and below.  Patient  Supplied Answers to Review of Systems   ENT ROS 10/4/2022   Ears, Nose, Throat: Hearing loss, Ringing/noise in ears       Physical Examination   The patient underwent a physical examination as described below. The pertinent positive and negative findings are summarized after the description of the examination.  Constitutional: The patient's developmental and nutritional status was assessed. The patient's voice quality was assessed.  Head and Face: The head and face were inspected for deformities. Facial muscle strength was assessed bilaterally.  Eyes: Extraocular movements and primary gaze alignment were assessed.  Ears, Nose, Mouth and Throat: The ears and nose were examined for deformities.The lips were examined for abnormalities.   Neck: The neck was visualized for abnormal neck masses  Respiratory: The nature of the breathing was observed.  Extremities: The hand extremities were examined for any clubbing or cyanosis.  Skin: The skin was examined for inflammatory or neoplastic conditions.  Neurologic: The patient's orientation, mood, and affect were noted. The cranial nerve  functions were examined.  Other pertinent positive and negative findings on physical examination:   Breathing comfortably on room air, no stridor with deep inspiration  no throat clearing throughout the visit     All other physical examination findings were within normal limits and noncontributory    Review of Relevant Clinical Data   I personally reviewed:  Notes:    Radiology:    Pathology:    Procedures:    Labs:  No results found for: TSH  Lab Results   Component Value Date    .0 11/20/2018    BUN 14.0 11/20/2018     No results found for: WBC, HGB, HCT, MCV, PLT  No results found for: PT, PTT, INR  No results found for: DERIK  No components found for: RHEUMATOIDFACTOR,  RF  No results found for: CRP  No components found for: CKTOT, URICACID  No components found for: C3, C4, DSDNAAB, NDNAABIFA  No results found for: MPOAB    Patient  "reported Quality of Life (QOL) Measures   Patient Supplied Answers To VHI Questionnaire  Voice Handicap Index (VHI-10) 10/9/2022   My voice makes it difficult for people to hear me 2   People have difficulty understanding me in a noisy room 2   My voice difficulties restrict my personal and social life.  2   I feel left out of conversations because of my voice 2   My voice problem causes me to lose income 0   I feel as though I have to strain to produce voice 2   The clarity of my voice is unpredictable 2   My voice problem upsets me 2   My voice makes me feel handicapped 2   People ask, \"What's wrong with your voice?\" 2   VHI-10 18         Patient Supplied Answers To EAT Questionnaire  Eating Assessment Tool (EAT-10) 2022   My swallowing problem has caused me to lose weight 0   My swallowing problem interferes with my ability to go out for meals 0   Swallowing liquids takes extra effort 0   Swallowing solids takes extra effort 0   Swallowing pills takes extra effort 0   Swallowing is painful 0   The pleasure of eating is affected by my swallowing 0   When I swallow food sticks in my throat 0   I cough when I eat 0   Swallowing is stressful 0   EAT-10 0         Patient Supplied Answers To CSI Questionnaire  Cough Severity Index (CSI) 2022   My cough is worse when I lie down 0   My coughing problem causes me to restrict my personal and social life 0   I tend to avoid places because of my cough problem 0   I feel embarrassed because of my coughing problem 0   People ask, ''What's wrong?'' because I cough a lot 0   I run out of air when I cough 0   My coughing problem affects my voice 0   My coughing problem limits my physical activity 0   My coughing problem upsets me 0   People ask me if I am sick because I cough a lot 0   CSI Score 0         @[email or web address]@     Impression & Plan     IMPRESSION: Mr. Odom is a 62 year old male who is being seen for the followin. Dysphonia  - noticed hoarse voice " 3-4 years ago unknown cause  - voice becomes tight and cuts off intermittently  - able to get short phrases out  - worse when breathing cold air in  - has been seen at other places and was told things look healthy structurally  - Dr. 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 (voice), M.A., CCC/SLP  - then underwent trial botox injection to both TAs, 1u on 9/27/22  - symptoms 10/4/22 - breathiness kicked in on Saturday, has had some coughing if he chugs liquids and if he has carbonate drinks  - discussed keeping a log of his symptoms  - symptoms 11/15/2022 are improved  - happy with voice  - no trouble swallowing since week 4 post injection  - recommended repeat injection after effects wear off, consider reduced dose  - discussed option of doing EMG botox - will keep with transnasal approach  - symptoms 12/13/2022 are stable voice overall with mild scratchiness  - Botox injection was postponed as patient continues to have benefit, though slight decrement   - discussed keeping a log   - symptoms 1/25/2023 are voice began straining 1.5 to 2 weeks ago - lasted for 4 months  - s/p 1.0 u Botox b/l TA   - felt like he swallowed some of it  -  symptoms 3/14/2022 are improved, felt less side effects without liquid dysphagia this time  - happy with voice now   - no trouble with liquids  Plan  - repeat injection 4/25 consider decreasing dosing to 0.75u b/l  - will reach out to me if voice is still good at 12 weeks and wants to postpone the injection     RETURN VISIT: 4/25/23 10:00 AM    Scribe Disclosure:  I, Aleksander Hodge, am serving as a scribe to document services personally performed by Kathi Christianson MD based on data collection and the provider's statements to me.     Kathi Christianson MD    Laryngology    [facility]  Department of  Otolaryngology - Head and Neck Surgery  Clinics & Surgery Center  [address] [place] [address]  Appointment line: [phone]  Fax: [phone]    10 minutes spent on the date of the encounter doing chart review, patient visit and documentation

## 2023-03-14 ENCOUNTER — VIRTUAL VISIT (OUTPATIENT)
Dept: OTOLARYNGOLOGY | Facility: CLINIC | Age: 63
End: 2023-03-14
Payer: COMMERCIAL

## 2023-03-14 DIAGNOSIS — J38.3 ADDUCTOR SPASMODIC DYSPHONIA: Primary | ICD-10-CM

## 2023-03-14 PROCEDURE — 99212 OFFICE O/P EST SF 10 MIN: CPT | Mod: VID | Performed by: OTOLARYNGOLOGY

## 2023-03-14 NOTE — PATIENT INSTRUCTIONS
1.  You were seen in the ENT Clinic today by Dr. Christianson. If you have any questions or concerns after your appointment, please call [phone]. Press option #1 for scheduling related needs. Press option #3 for Nurse advice.    2.  Dr. Christianson has recommended the following:   - botox injection    3.  Plan is to return to clinic 4/25 at 10:00 am      Martha Paez  [phone]  [facility] - Otolaryngology

## 2023-03-14 NOTE — LETTER
3/14/2023       RE: Dennys Odom  [address] [facility] [address]     Dear Colleague,    Thank you for referring your patient, Dennys Odom, to the [facility] EAR NOSE AND THROAT CLINIC [place] [facility]. Please see a copy of my visit note below.          Visit end: 10:37 AM      [facility]   at the [facility]   Otolaryngology Clinic     Patient: Dennys Odom    MRN: [id number]    : 1960    Age/Gender: 62 year old male  Date of Service: 3/14/2023  Rendering Provider:   Kathi Christianson MD     Chief Complaint   Dysphonia  S/p TA botox injection 22  Interval History   HISTORY OF PRESENT ILLNESS: Mr. Odom is a 62 year old male is being followed for dysphonia.   he was initially seen on 2022. Please refer to this note for full history.     Today, he presents via video. he reports   - enjoyed trip to [place]  - took several weeks for voice to become normal  - no trouble with swallowing liquids    Date of injection   location/amount injected  breathiness kicked in symptoms settled  symptoms started to wear off    22 1.0 unit(s) b/l TA Day 2, had coughing with liquids 4 weeks with liquids Not yet, week 7 is going well today  Wife noted voice straining 1.5 to 2 weeks ago   23 1.0 unit(s) b/l TA   2 weeks, no problems with liquids           PAST MEDICAL HISTORY:   Past Medical History:   Diagnosis Date     Atrial fibrillation (H)      Chronic maxillary sinusitis      Hyperlipidemia      Right knee pain      Snoring        PAST SURGICAL HISTORY:   Past Surgical History:   Procedure Laterality Date     LARYNGOSCOPY, FLEXIBLE WITH INJECTION N/A 2022    Procedure: LARYNGOSCOPY, FLEXIBLE WITH INJECTION OF BOTOX;  Surgeon: Kathi Christianson MD;  Location: [facility]       CURRENT MEDICATIONS:   Current Outpatient Medications:      ibuprofen (ADVIL) 200 MG capsule, Take 200 mg by  mouth every 4 hours as needed for fever, Disp: , Rfl:     ALLERGIES: Penicillins    SOCIAL HISTORY:    Social History     Socioeconomic History     Marital status:      Spouse name: Not on file     Number of children: Not on file     Years of education: Not on file     Highest education level: Not on file   Occupational History     Not on file   Tobacco Use     Smoking status: Never     Smokeless tobacco: Former   Substance and Sexual Activity     Alcohol use: Yes     Comment: a couple beers on weekends     Drug use: Not on file     Sexual activity: Not on file   Other Topics Concern     Not on file   Social History Narrative     Not on file     Social Determinants of Health     Financial Resource Strain: Not on file   Food Insecurity: Not on file   Transportation Needs: Not on file   Physical Activity: Not on file   Stress: Not on file   Social Connections: Not on file   Intimate Partner Violence: Not on file   Housing Stability: Not on file         FAMILY HISTORY:   Family History   Problem Relation Age of Onset     No Known Problems Mother      Hypertension Father      Obesity Father      No Known Problems Sister      No Known Problems Sister      No Known Problems Sister      No Known Problems Sister       Non-contributory for problems with anesthesia    REVIEW OF SYSTEMS:   The patient was asked a 14 point review of systems regarding constitutional symptoms, eye symptoms, ears, nose, mouth, throat symptoms, cardiovascular symptoms, respiratory symptoms, gastrointestinal symptoms, genitourinary symptoms, musculoskeletal symptoms, integumentary symptoms, neurological symptoms, psychiatric symptoms, endocrine symptoms, hematologic/lymphatic symptoms, and allergic/ immunologic symptoms.   The pertinent factors have been included in the HPI and below.  Patient Supplied Answers to Review of Systems   ENT ROS 10/4/2022   Ears, Nose, Throat: Hearing loss, Ringing/noise in ears       Physical Examination   The  patient underwent a physical examination as described below. The pertinent positive and negative findings are summarized after the description of the examination.  Constitutional: The patient's developmental and nutritional status was assessed. The patient's voice quality was assessed.  Head and Face: The head and face were inspected for deformities. Facial muscle strength was assessed bilaterally.  Eyes: Extraocular movements and primary gaze alignment were assessed.  Ears, Nose, Mouth and Throat: The ears and nose were examined for deformities.The lips were examined for abnormalities.   Neck: The neck was visualized for abnormal neck masses  Respiratory: The nature of the breathing was observed.  Extremities: The hand extremities were examined for any clubbing or cyanosis.  Skin: The skin was examined for inflammatory or neoplastic conditions.  Neurologic: The patient's orientation, mood, and affect were noted. The cranial nerve  functions were examined.  Other pertinent positive and negative findings on physical examination:   Breathing comfortably on room air, no stridor with deep inspiration  no throat clearing throughout the visit     All other physical examination findings were within normal limits and noncontributory    Review of Relevant Clinical Data   I personally reviewed:  Notes:    Radiology:    Pathology:    Procedures:    Labs:  No results found for: TSH  Lab Results   Component Value Date    .0 11/20/2018    BUN 14.0 11/20/2018     No results found for: WBC, HGB, HCT, MCV, PLT  No results found for: PT, PTT, INR  No results found for: DERIK  No components found for: RHEUMATOIDFACTOR,  RF  No results found for: CRP  No components found for: CKTOT, URICACID  No components found for: C3, C4, DSDNAAB, NDNAABIFA  No results found for: MPOAB    Patient reported Quality of Life (QOL) Measures   Patient Supplied Answers To VHI Questionnaire  Voice Handicap Index (VHI-10) 10/9/2022   My voice makes it  "difficult for people to hear me 2   People have difficulty understanding me in a noisy room 2   My voice difficulties restrict my personal and social life.  2   I feel left out of conversations because of my voice 2   My voice problem causes me to lose income 0   I feel as though I have to strain to produce voice 2   The clarity of my voice is unpredictable 2   My voice problem upsets me 2   My voice makes me feel handicapped 2   People ask, \"What's wrong with your voice?\" 2   VHI-10 18         Patient Supplied Answers To EAT Questionnaire  Eating Assessment Tool (EAT-10) 2022   My swallowing problem has caused me to lose weight 0   My swallowing problem interferes with my ability to go out for meals 0   Swallowing liquids takes extra effort 0   Swallowing solids takes extra effort 0   Swallowing pills takes extra effort 0   Swallowing is painful 0   The pleasure of eating is affected by my swallowing 0   When I swallow food sticks in my throat 0   I cough when I eat 0   Swallowing is stressful 0   EAT-10 0         Patient Supplied Answers To CSI Questionnaire  Cough Severity Index (CSI) 2022   My cough is worse when I lie down 0   My coughing problem causes me to restrict my personal and social life 0   I tend to avoid places because of my cough problem 0   I feel embarrassed because of my coughing problem 0   People ask, ''What's wrong?'' because I cough a lot 0   I run out of air when I cough 0   My coughing problem affects my voice 0   My coughing problem limits my physical activity 0   My coughing problem upsets me 0   People ask me if I am sick because I cough a lot 0   CSI Score 0         @[email or web address]@     Impression & Plan     IMPRESSION: Mr. Odom is a 62 year old male who is being seen for the followin. Dysphonia  - noticed hoarse voice 3-4 years ago unknown cause  - voice becomes tight and cuts off intermittently  - able to get short phrases out  - worse when breathing cold air in  - " has been seen at other places and was told things look healthy structurally  - Dr. 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 (voice), M.A., CCC/SLP  - then underwent trial botox injection to both TAs, 1u on 9/27/22  - symptoms 10/4/22 - breathiness kicked in on Saturday, has had some coughing if he chugs liquids and if he has carbonate drinks  - discussed keeping a log of his symptoms  - symptoms 11/15/2022 are improved  - happy with voice  - no trouble swallowing since week 4 post injection  - recommended repeat injection after effects wear off, consider reduced dose  - discussed option of doing EMG botox - will keep with transnasal approach  - symptoms 12/13/2022 are stable voice overall with mild scratchiness  - Botox injection was postponed as patient continues to have benefit, though slight decrement   - discussed keeping a log   - symptoms 1/25/2023 are voice began straining 1.5 to 2 weeks ago - lasted for 4 months  - s/p 1.0 u Botox b/l TA   - felt like he swallowed some of it  - symptoms 3/14/2022 are improved, felt less side effects without liquid dysphagia this time  - happy with voice now   - no trouble with liquids  Plan  -  repeat injection 4/25 consider decreasing dosing to 0.75u b/l  - will reach out to me if voice is still good at 12 weeks and wants to postpone the injection     RETURN VISIT: 4/25/23 10:00 AM    Scribe Disclosure:  I, Aleksander Hodge, am serving as a scribe to document services personally performed by Kathi Christianson MD based on data collection and the provider's statements to me.     Kathi Christianson MD    Laryngology    [facility]  Department of  Otolaryngology - Head and Neck Surgery  Clinics & Surgery Center  [address] [place] [address]  Appointment line: [phone]  Fax: [phone]    10 minutes spent on the date of the encounter doing chart review, patient visit and documentation

## 2023-04-15 ENCOUNTER — HEALTH MAINTENANCE LETTER (OUTPATIENT)
Age: 63
End: 2023-04-15

## 2023-05-16 ENCOUNTER — OFFICE VISIT (OUTPATIENT)
Dept: OTOLARYNGOLOGY | Facility: CLINIC | Age: 63
End: 2023-05-16
Payer: COMMERCIAL

## 2023-05-16 VITALS — WEIGHT: 210 LBS | BODY MASS INDEX: 28.44 KG/M2 | HEIGHT: 72 IN

## 2023-05-16 DIAGNOSIS — J38.3 OTHER DISEASES OF VOCAL CORDS: ICD-10-CM

## 2023-05-16 DIAGNOSIS — R49.0 DYSPHONIA: Primary | ICD-10-CM

## 2023-05-16 PROCEDURE — 99207 PR NO CHARGE LOS: CPT | Performed by: OTOLARYNGOLOGY

## 2023-05-16 PROCEDURE — 64617 CHEMODENER MUSCLE LARYNX EMG: CPT | Mod: 50 | Performed by: OTOLARYNGOLOGY

## 2023-05-16 ASSESSMENT — PAIN SCALES - GENERAL: PAINLEVEL: NO PAIN (0)

## 2023-05-16 NOTE — PATIENT INSTRUCTIONS
1.  You were seen in the ENT Clinic today by . If you have any questions or concerns after your appointment, please call [phone]. Press option #1 for scheduling related needs. Press option #3 for Nurse advice.    2  Plan is to return to clinic 8/15/23 at 10 am for repeat botox injection      Johanna Coelho LPN  [phone]  [facility] - Otolaryngology

## 2023-05-16 NOTE — PROGRESS NOTES
[facility]   at the [facility]   Otolaryngology Clinic     Patient: Dennys Odom    MRN: [id number]    : 1960    Age/Gender: 62 year old male  Date of Service: 2023  Rendering Provider:   Kathi Christianson MD     Chief Complaint   Dysphonia  S/p TA botox injection 22, 23  Interval History   HISTORY OF PRESENT ILLNESS: Mr. Odom is a 62 year old male is being followed for dysphonia. He was initially seen on 2022. Please refer to this note for full history.      Today, he presents for follow up. He reports:     Date of injection   location/amount injected  breathiness kicked in symptoms settled  symptoms started to wear off    22 1.0 unit(s) b/l TA Day 2, had coughing with liquids 4 weeks with liquids Not yet, week 7 is going well today  Wife noted voice straining 1.5 to 2 weeks ago   23 1.0 unit(s) b/l TA    lasted for close to 4months       23 1.0 unit(s) b/l TA                  PAST MEDICAL HISTORY:   Past Medical History:   Diagnosis Date     Atrial fibrillation (H)      Chronic maxillary sinusitis      Hyperlipidemia      Right knee pain      Snoring        PAST SURGICAL HISTORY:   Past Surgical History:   Procedure Laterality Date     LARYNGOSCOPY, FLEXIBLE WITH INJECTION N/A 2022    Procedure: LARYNGOSCOPY, FLEXIBLE WITH INJECTION OF BOTOX;  Surgeon: Kathi Christianson MD;  Location: [facility] OR       CURRENT MEDICATIONS:   Current Outpatient Medications:      ibuprofen (ADVIL) 200 MG capsule, Take 200 mg by mouth every 4 hours as needed for fever, Disp: , Rfl:     ALLERGIES: Penicillins    SOCIAL HISTORY:    Social History     Socioeconomic History     Marital status:      Spouse name: Not on file     Number of children: Not on file     Years of education: Not on file     Highest education level: Not on file   Occupational History     Not on file   Tobacco Use     Smoking status: Never     Smokeless tobacco: Former   Vaping Use      Vaping status: Not on file   Substance and Sexual Activity     Alcohol use: Yes     Comment: a couple beers on weekends     Drug use: Not on file     Sexual activity: Not on file   Other Topics Concern     Not on file   Social History Narrative     Not on file     Social Determinants of Health     Financial Resource Strain: Not on file   Food Insecurity: Not on file   Transportation Needs: Not on file   Physical Activity: Not on file   Stress: Not on file   Social Connections: Not on file   Intimate Partner Violence: Not on file   Housing Stability: Not on file         FAMILY HISTORY:   Family History   Problem Relation Age of Onset     No Known Problems Mother      Hypertension Father      Obesity Father      No Known Problems Sister      No Known Problems Sister      No Known Problems Sister      No Known Problems Sister       Non-contributory for problems with anesthesia    REVIEW OF SYSTEMS:   The patient was asked a 14 point review of systems regarding constitutional symptoms, eye symptoms, ears, nose, mouth, throat symptoms, cardiovascular symptoms, respiratory symptoms, gastrointestinal symptoms, genitourinary symptoms, musculoskeletal symptoms, integumentary symptoms, neurological symptoms, psychiatric symptoms, endocrine symptoms, hematologic/lymphatic symptoms, and allergic/ immunologic symptoms.   The pertinent factors have been included in the HPI and below.  Patient Supplied Answers to Review of Systems      10/4/2022     9:33 AM   [facility] ROS   Ears, Nose, Throat Hearing loss    Ringing/noise in ears       Physical Examination   The patient underwent a physical examination as described below. The pertinent positive and negative findings are summarized after the description of the examination.  Constitutional: The patient's developmental and nutritional status was assessed. The patient's voice quality was assessed.  Head and Face: The head and face were inspected for deformities. The sinuses were  palpated. The salivary glands were palpated. Facial muscle strength was assessed bilaterally.  Eyes: Extraocular movements and primary gaze alignment were assessed.  Ears, Nose, Mouth and Throat: The ears and nose were examined for deformities. The nasal septum, mucosa, and turbinates were inspected by anterior rhinoscopy. The lips, teeth, and gums were examined for abnormalities. The oral mucosa, tongue, palate, tonsils, lateral and posterior pharynx were inspected for the presence of asymmetry or mucosal lesions.    Neck: The tracheal position was noted, and the neck mass palpated to determine if there were any asymmetries, abnormal neck masses, thyromegally, or thyroid nodules.  Respiratory: The nature of the breathing and chest expansion/symmetry was observed.  Cardiovascular: The patient was examined to determine the presence of any edema or jugular venous distension.  Abdomen: The contour of the abdomen was noted.  Lymphatic: The patient was examined for infraclavicular lymphadenopathy.  Musculoskeletal: The patient was inspected for the presence of skeletal deformities.  Extremities: The extremities were examined for any clubbing or cyanosis.  Skin: The skin was examined for inflammatory or neoplastic conditions.  Neurologic: The patient's orientation, mood, and affect were noted. The cranial nerve  functions were examined.  Other pertinent positive and negative findings on physical examination:   OC/OP: no lesions, uvula midline, soft palate elevates symmetrically   Neck: no lesions, no TH tenderness to palpation  All other physical examination findings were within normal limits and noncontributory.    Procedures   Chemodenervation of the Larynx - Botulinum Toxin (CPT 66643)      Pre-procedure diagnosis: Adductor spasmodic dysphonia  Post-procedure diagnosis: same  Indication for procedure: Mr. Odom is a 62 year old year old male with hyperfunctional larynx. Botox injections at regular intervals are  routine therapy for hyperfunctional larynx such as spasmodic dysphonia, laryngospasm, chronic cough, paradoxical vocal fold motion, laryngeal synkinesis.  Procedure(s): Injection of Botox into the Each Thyroarytenoid Muscle  Details of Procedure: After informed consent was obtained, the patient was seated in the examination chair. The area of the thyroid cartilage, cricothyroid space and vocal folds were identified as the external neck landmarks.  The area was cleaned with alcohol and a 27 gauge needle was then used to inject botulinum toxin percutaneously. The needle was passed transcricothyroid into the larynx.   Anesthesia type: none or  topical 4% lidocaine with 0.25% phenylephrine     Findings:                  1.0 units of Botox into the Each thyroarytenoid muscle with EMG guidance     Estimated Blood Loss: none  Complications: None  Disposition: Patient tolerated the procedure well    Review of Relevant Clinical Data   I personally reviewed:  Labs:  No results found for: TSH  Lab Results   Component Value Date    .0 11/20/2018    BUN 14.0 11/20/2018     No results found for: WBC, HGB, HCT, MCV, PLT  No results found for: PT, PTT, INR  No results found for: DERIK  No components found for: RHEUMATOIDFACTOR,  RF  No results found for: CRP  No components found for: CKTOT, URICACID  No components found for: C3, C4, DSDNAAB, NDNAABIFA  No results found for: MPOAB    Patient reported Quality of Life (QOL) Measures   Patient Supplied Answers To VHI Questionnaire      10/9/2022     8:56 PM   Voice Handicap Index (VHI-10)   My voice makes it difficult for people to hear me 2   People have difficulty understanding me in a noisy room 2   My voice difficulties restrict my personal and social life.  2   I feel left out of conversations because of my voice 2   My voice problem causes me to lose income 0   I feel as though I have to strain to produce voice 2   The clarity of my voice is unpredictable 2   My voice problem  "upsets me 2   My voice makes me feel handicapped 2   People ask, \"What's wrong with your voice?\" 2   VHI-10 18         Patient Supplied Answers To EAT Questionnaire      2022     8:40 AM   Eating Assessment Tool (EAT-10)   My swallowing problem has caused me to lose weight 0   My swallowing problem interferes with my ability to go out for meals 0   Swallowing liquids takes extra effort 0   Swallowing solids takes extra effort 0   Swallowing pills takes extra effort 0   Swallowing is painful 0   The pleasure of eating is affected by my swallowing 0   When I swallow food sticks in my throat 0   I cough when I eat 0   Swallowing is stressful 0   EAT-10 0         Patient Supplied Answers To CSI Questionnaire      2022     8:41 AM   Cough Severity Index (CSI)   My cough is worse when I lie down 0   My coughing problem causes me to restrict my personal and social life 0   I tend to avoid places because of my cough problem 0   I feel embarrassed because of my coughing problem 0   People ask, ''What's wrong?'' because I cough a lot 0   I run out of air when I cough 0   My coughing problem affects my voice 0   My coughing problem limits my physical activity 0   My coughing problem upsets me 0   People ask me if I am sick because I cough a lot 0   CSI Score 0         Patient Supplied Answers to Dyspnea Index Questionnaire:       View : No data to display.                Impression & Plan     IMPRESSION: Mr. Odom is a 62 year old male who is being seen for the followin. Dysphonia  - noticed hoarse voice 3-4 years ago unknown cause  - voice becomes tight and cuts off intermittently  - able to get short phrases out  - worse when breathing cold air in  - has been seen at other places and was told things look healthy structurally  - Dr. 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 (voice), M.A., CCC/SLP  - then underwent trial botox injection to both TAs, 1u on 9/27/22  - symptoms 10/4/22 - breathiness kicked in on Saturday, has had some coughing if he chugs liquids and if he has carbonate drinks  - discussed keeping a log of his symptoms  - symptoms 11/15/2022 are improved  - happy with voice  - no trouble swallowing since week 4 post injection  - recommended repeat injection after effects wear off, consider reduced dose  - discussed option of doing EMG botox - will keep with transnasal approach  - symptoms 12/13/2022 are stable voice overall with mild scratchiness  - Botox injection was postponed as patient continues to have benefit, though slight decrement   - discussed keeping a log   - symptoms 1/25/2023 are voice began straining 1.5 to 2 weeks ago - lasted for 4 months  - s/p 1.0 u Botox b/l TA 1/31/23  - felt like he swallowed some of it  - symptoms 5/16/2023 improved for nearly 4 months  - s/p 1.0 unit(s) Botox b/l TA 5/16/23, felt that he swallowed liquid with first injection (right)  Plan  - observation         RETURN VISIT: 3 months for botox with EMG (1.0u)    Scribe Disclosure:  I, Baldev Alonso, am serving as a scribe to document services personally performed by Kathi Christianson MD based on data collection and the provider's  statements to me.     Kathi Christianson MD    Laryngology    [facility]  Department of  Otolaryngology - Head and Neck Surgery  [facility] [place] [address]  Appointment line: [phone]  Fax: [phone]  [email or web address]

## 2023-05-16 NOTE — LETTER
2023       RE: Dennys Odom  [address] [facility] [address]     Dear Colleague,    Thank you for referring your patient, Dennys Odom, to the [facility] EAR NOSE AND THROAT CLINIC [place] at [facility]. Please see a copy of my visit note below.        [facility]   at the [facility]   Otolaryngology Clinic     Patient: Dennys Odom    MRN: [id number]    : 1960    Age/Gender: 62 year old male  Date of Service: 2023  Rendering Provider:   Kathi Christianson MD     Chief Complaint   Dysphonia  S/p TA botox injection 22, 23  Interval History   HISTORY OF PRESENT ILLNESS: Mr. Odom is a 62 year old male is being followed for dysphonia. He was initially seen on 2022. Please refer to this note for full history.      Today, he presents for follow up. He reports:     Date of injection   location/amount injected  breathiness kicked in symptoms settled  symptoms started to wear off    22 1.0 unit(s) b/l TA Day 2, had coughing with liquids 4 weeks with liquids Not yet, week 7 is going well today  Wife noted voice straining 1.5 to 2 weeks ago   23 1.0 unit(s) b/l TA    lasted for close to 4months       23 1.0 unit(s) b/l TA                  PAST MEDICAL HISTORY:   Past Medical History:   Diagnosis Date    Atrial fibrillation (H)     Chronic maxillary sinusitis     Hyperlipidemia     Right knee pain     Snoring        PAST SURGICAL HISTORY:   Past Surgical History:   Procedure Laterality Date    LARYNGOSCOPY, FLEXIBLE WITH INJECTION N/A 2022    Procedure: LARYNGOSCOPY, FLEXIBLE WITH INJECTION OF BOTOX;  Surgeon: Kathi Christianson MD;  Location: [facility] OR       CURRENT MEDICATIONS:   Current Outpatient Medications:     ibuprofen (ADVIL) 200 MG capsule, Take 200 mg by mouth every 4 hours as needed for fever, Disp: , Rfl:     ALLERGIES: Penicillins    SOCIAL HISTORY:     Social History     Socioeconomic History    Marital status:      Spouse name: Not on file    Number of children: Not on file    Years of education: Not on file    Highest education level: Not on file   Occupational History    Not on file   Tobacco Use    Smoking status: Never    Smokeless tobacco: Former   Vaping Use    Vaping status: Not on file   Substance and Sexual Activity    Alcohol use: Yes     Comment: a couple beers on weekends    Drug use: Not on file    Sexual activity: Not on file   Other Topics Concern    Not on file   Social History Narrative    Not on file     Social Determinants of Health     Financial Resource Strain: Not on file   Food Insecurity: Not on file   Transportation Needs: Not on file   Physical Activity: Not on file   Stress: Not on file   Social Connections: Not on file   Intimate Partner Violence: Not on file   Housing Stability: Not on file         FAMILY HISTORY:   Family History   Problem Relation Age of Onset    No Known Problems Mother     Hypertension Father     Obesity Father     No Known Problems Sister     No Known Problems Sister     No Known Problems Sister     No Known Problems Sister       Non-contributory for problems with anesthesia    REVIEW OF SYSTEMS:   The patient was asked a 14 point review of systems regarding constitutional symptoms, eye symptoms, ears, nose, mouth, throat symptoms, cardiovascular symptoms, respiratory symptoms, gastrointestinal symptoms, genitourinary symptoms, musculoskeletal symptoms, integumentary symptoms, neurological symptoms, psychiatric symptoms, endocrine symptoms, hematologic/lymphatic symptoms, and allergic/ immunologic symptoms.   The pertinent factors have been included in the HPI and below.  Patient Supplied Answers to Review of Systems      10/4/2022     9:33 AM    ENT ROS   Ears, Nose, Throat Hearing loss    Ringing/noise in ears       Physical Examination   The patient underwent a physical examination as described  below. The pertinent positive and negative findings are summarized after the description of the examination.  Constitutional: The patient's developmental and nutritional status was assessed. The patient's voice quality was assessed.  Head and Face: The head and face were inspected for deformities. The sinuses were palpated. The salivary glands were palpated. Facial muscle strength was assessed bilaterally.  Eyes: Extraocular movements and primary gaze alignment were assessed.  Ears, Nose, Mouth and Throat: The ears and nose were examined for deformities. The nasal septum, mucosa, and turbinates were inspected by anterior rhinoscopy. The lips, teeth, and gums were examined for abnormalities. The oral mucosa, tongue, palate, tonsils, lateral and posterior pharynx were inspected for the presence of asymmetry or mucosal lesions.    Neck: The tracheal position was noted, and the neck mass palpated to determine if there were any asymmetries, abnormal neck masses, thyromegally, or thyroid nodules.  Respiratory: The nature of the breathing and chest expansion/symmetry was observed.  Cardiovascular: The patient was examined to determine the presence of any edema or jugular venous distension.  Abdomen: The contour of the abdomen was noted.  Lymphatic: The patient was examined for infraclavicular lymphadenopathy.  Musculoskeletal: The patient was inspected for the presence of skeletal deformities.  Extremities: The extremities were examined for any clubbing or cyanosis.  Skin: The skin was examined for inflammatory or neoplastic conditions.  Neurologic: The patient's orientation, mood, and affect were noted. The cranial nerve  functions were examined.  Other pertinent positive and negative findings on physical examination:   OC/OP: no lesions, uvula midline, soft palate elevates symmetrically   Neck: no lesions, no TH tenderness to palpation  All other physical examination findings were within normal limits and  noncontributory.    Procedures   Chemodenervation of the Larynx - Botulinum Toxin (CPT 39912)      Pre-procedure diagnosis: Adductor spasmodic dysphonia  Post-procedure diagnosis: same  Indication for procedure: Mr. Odom is a 62 year old year old male with hyperfunctional larynx. Botox injections at regular intervals are routine therapy for hyperfunctional larynx such as spasmodic dysphonia, laryngospasm, chronic cough, paradoxical vocal fold motion, laryngeal synkinesis.  Procedure(s): Injection of Botox into the Each Thyroarytenoid Muscle  Details of Procedure: After informed consent was obtained, the patient was seated in the examination chair. The area of the thyroid cartilage, cricothyroid space and vocal folds were identified as the external neck landmarks.  The area was cleaned with alcohol and a 27 gauge needle was then used to inject botulinum toxin percutaneously. The needle was passed transcricothyroid into the larynx.   Anesthesia type: none or  topical 4% lidocaine with 0.25% phenylephrine     Findings:                  1.0 units of Botox into the Each thyroarytenoid muscle with EMG guidance     Estimated Blood Loss: none  Complications: None  Disposition: Patient tolerated the procedure well    Review of Relevant Clinical Data   I personally reviewed:  Labs:  No results found for: TSH  Lab Results   Component Value Date    .0 11/20/2018    BUN 14.0 11/20/2018     No results found for: WBC, HGB, HCT, MCV, PLT  No results found for: PT, PTT, INR  No results found for: DERIK  No components found for: RHEUMATOIDFACTOR,  RF  No results found for: CRP  No components found for: CKTOT, URICACID  No components found for: C3, C4, DSDNAAB, NDNAABIFA  No results found for: MPOAB    Patient reported Quality of Life (QOL) Measures   Patient Supplied Answers To VHI Questionnaire      10/9/2022     8:56 PM   Voice Handicap Index (VHI-10)   My voice makes it difficult for people to hear me 2   People have  "difficulty understanding me in a noisy room 2   My voice difficulties restrict my personal and social life.  2   I feel left out of conversations because of my voice 2   My voice problem causes me to lose income 0   I feel as though I have to strain to produce voice 2   The clarity of my voice is unpredictable 2   My voice problem upsets me 2   My voice makes me feel handicapped 2   People ask, \"What's wrong with your voice?\" 2   VHI-10 18         Patient Supplied Answers To EAT Questionnaire      2022     8:40 AM   Eating Assessment Tool (EAT-10)   My swallowing problem has caused me to lose weight 0   My swallowing problem interferes with my ability to go out for meals 0   Swallowing liquids takes extra effort 0   Swallowing solids takes extra effort 0   Swallowing pills takes extra effort 0   Swallowing is painful 0   The pleasure of eating is affected by my swallowing 0   When I swallow food sticks in my throat 0   I cough when I eat 0   Swallowing is stressful 0   EAT-10 0         Patient Supplied Answers To CSI Questionnaire      2022     8:41 AM   Cough Severity Index (CSI)   My cough is worse when I lie down 0   My coughing problem causes me to restrict my personal and social life 0   I tend to avoid places because of my cough problem 0   I feel embarrassed because of my coughing problem 0   People ask, ''What's wrong?'' because I cough a lot 0   I run out of air when I cough 0   My coughing problem affects my voice 0   My coughing problem limits my physical activity 0   My coughing problem upsets me 0   People ask me if I am sick because I cough a lot 0   CSI Score 0         Patient Supplied Answers to Dyspnea Index Questionnaire:       View : No data to display.                Impression & Plan     IMPRESSION: Mr. Odom is a 62 year old male who is being seen for the followin. Dysphonia  - noticed hoarse voice 3-4 years ago unknown cause  - voice becomes tight and cuts off " intermittently  - able to get short phrases out  - worse when breathing cold air in  - has been seen at other places and was told things look healthy structurally  - Dr. 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 (voice), MPelonAPelon, CCC/SLP  - then underwent trial botox injection to both TAs, 1u on 9/27/22  - symptoms 10/4/22 - breathiness kicked in on Saturday, has had some coughing if he chugs liquids and if he has carbonate drinks  - discussed keeping a log of his symptoms  - symptoms 11/15/2022 are improved  - happy with voice  - no trouble swallowing since week 4 post injection  - recommended repeat injection after effects wear off, consider reduced dose  - discussed option of doing EMG botox - will keep with transnasal approach  - symptoms 12/13/2022 are stable voice overall with mild scratchiness  - Botox injection was postponed as patient continues to have benefit, though slight decrement   - discussed keeping a log   - symptoms 1/25/2023 are voice began straining 1.5 to 2 weeks ago - lasted for 4 months  - s/p 1.0 u Botox b/l TA 1/31/23  - felt like he swallowed some of it  - symptoms 5/16/2023 improved for nearly 4 months  - s/p 1.0  unit(s) Botox b/l TA 5/16/23, felt that he swallowed liquid with first injection (right)  Plan  - observation         RETURN VISIT: 3 months for botox with EMG (1.0u)    Scribe Disclosure:  I, Baldev Alonso, am serving as a scribe to document services personally performed by Kathi Christianson MD based on data collection and the provider's statements to me.     Kathi Christianson MD    Laryngology    [facility]  Department of  Otolaryngology - Head and Neck Surgery  [facility] [place] [address]  Appointment line: [phone]  Fax: [phone]  [email or web address]

## 2023-06-15 ENCOUNTER — PATIENT OUTREACH (OUTPATIENT)
Dept: OTOLARYNGOLOGY | Facility: CLINIC | Age: 63
End: 2023-06-15
Payer: COMMERCIAL

## 2023-06-15 NOTE — PROGRESS NOTES
Called patient to inquire about MyChart message. Patient is sounding airy and will need a lower dose of botox. Will keep follow up for August and follow up beforehand in case we need to push out appointment. Patient was agreeable and verbalized understanding of the situation. Martha Paez RN on 6/15/2023 at 10:05 AM

## 2023-09-19 ENCOUNTER — OFFICE VISIT (OUTPATIENT)
Dept: OTOLARYNGOLOGY | Facility: CLINIC | Age: 63
End: 2023-09-19
Payer: COMMERCIAL

## 2023-09-19 DIAGNOSIS — J38.3 ADDUCTOR SPASMODIC DYSPHONIA: Primary | ICD-10-CM

## 2023-09-19 PROCEDURE — 64617 CHEMODENER MUSCLE LARYNX EMG: CPT | Mod: 50 | Performed by: OTOLARYNGOLOGY

## 2023-09-19 NOTE — PATIENT INSTRUCTIONS
1.  You were seen in the ENT Clinic today by Dr. Christianson. If you have any questions or concerns after your appointment, please call [phone]. Press option #1 for scheduling related needs. Press option #3 for Nurse advice.    2. Plan is to return to clinic 11/21 at 10:30 AM      Martha Paez  [phone]  [facility] - Otolaryngology

## 2023-09-19 NOTE — LETTER
9/19/2023       RE: Dennys Odom  [address] [facility] [address]     Dear Colleague,    Thank you for referring your patient, Dennys Odom, to the [facility] EAR NOSE AND THROAT CLINIC [place] at [facility]. Please see a copy of my visit note below.    Date of injection   location/amount injected  breathiness kicked in symptoms settled  symptoms started to wear off    9/27/22 1.0 unit(s) b/l TA Day 2, had coughing with liquids 4 weeks with liquids Not yet, week 7 is going well today  Wife noted voice straining 1.5 to 2 weeks ago   1/31/23 1.0 unit(s) b/l TA    lasted for close to 4months        5/16/23 1.0 unit(s) b/l TA  felt breathiness that seemed pretty long even though it seemed like he swallowed the right sided injection               9/19/23 0.75 unit(s) b/l TA                 Chemodenervation of the Larynx - Botulinum Toxin (CPT 71422)     Pre-procedure diagnosis: Adductor spasmodic dysphonia  Post-procedure diagnosis: same  Indication for procedure: Mr. Odom is a 62 year old year old male with hyperfunctional larynx. Botox injections at regular intervals are routine therapy for hyperfunctional larynx such as spasmodic dysphonia, laryngospasm, chronic cough, paradoxical vocal fold motion, laryngeal synkinesis.  Procedure(s): Injection of Botox into the Each Thyroarytenoid Muscle  Details of Procedure: After informed consent was obtained, the patient was seated in the examination chair. The area of the thyroid cartilage, cricothyroid space and vocal folds were identified as the external neck landmarks.  The area was cleaned with alcohol and a 27 gauge needle was then used to inject botulinum toxin percutaneously. The needle was passed transcricothyroid into the larynx.     Anesthesia type: none      Findings:     0.75 units of Botox into the Each thyroarytenoid muscle   Right sided is easy though he feels like he  swallows a bit, left side feel the thyroid lamina and really go hard underneath it   Had good EMG signal on both sides    Estimated Blood Loss: none  Complications: None  Disposition: Patient tolerated the procedure well    Will schedule for 3 months ( will call 1 week prior to see if he wants to postpone)      Again, thank you for allowing me to participate in the care of your patient.      Sincerely,    Kathi Christianson MD

## 2023-09-19 NOTE — PROGRESS NOTES
Date of injection   location/amount injected  breathiness kicked in symptoms settled  symptoms started to wear off    9/27/22 1.0 unit(s) b/l TA Day 2, had coughing with liquids 4 weeks with liquids Not yet, week 7 is going well today  Wife noted voice straining 1.5 to 2 weeks ago   1/31/23 1.0 unit(s) b/l TA    lasted for close to 4months        5/16/23 1.0 unit(s) b/l TA  felt breathiness that seemed pretty long even though it seemed like he swallowed the right sided injection               9/19/23 0.75 unit(s) b/l TA                 Chemodenervation of the Larynx - Botulinum Toxin (CPT 08683)     Pre-procedure diagnosis: Adductor spasmodic dysphonia  Post-procedure diagnosis: same  Indication for procedure: Mr. Odom is a 62 year old year old male with hyperfunctional larynx. Botox injections at regular intervals are routine therapy for hyperfunctional larynx such as spasmodic dysphonia, laryngospasm, chronic cough, paradoxical vocal fold motion, laryngeal synkinesis.  Procedure(s): Injection of Botox into the Each Thyroarytenoid Muscle  Details of Procedure: After informed consent was obtained, the patient was seated in the examination chair. The area of the thyroid cartilage, cricothyroid space and vocal folds were identified as the external neck landmarks.  The area was cleaned with alcohol and a 27 gauge needle was then used to inject botulinum toxin percutaneously. The needle was passed transcricothyroid into the larynx.     Anesthesia type: none      Findings:     0.75 units of Botox into the Each thyroarytenoid muscle   Right sided is easy though he feels like he swallows a bit, left side feel the thyroid lamina and really go hard underneath it   Had good EMG signal on both sides    Estimated Blood Loss: none  Complications: None  Disposition: Patient tolerated the procedure well    Will schedule for 3 months ( will call 1 week prior to see if he wants to postpone)

## 2023-09-19 NOTE — NURSING NOTE
Invasive Procedure Safety Checklist  Procedure:  Botox Injection    Responsible person(s):  Complete sections as appropriate and electronically sign and date below.    Staff/Provider  Consent documentation on chart:  YES  H&P is not applicable (when straight local anesthesia is used).    Procedure Team  Completed by comparing informed consent documentation, information on the patient record and/or the marked surgical site, and discussion with the patient/guardian.     Verified:  (Select all that apply)  Patient identification (two indicators)  Procedure to be performed  Procedure site and /or laterality and/or level  Consent  Procedure site:  Site can not be marked due to location.  Provider Cronin - Site/Laterality/Level:  No Level or Structure  Staff/Provider:  Relevant images, properly labeled and displayed.    Procedure Team:  *Pause for the Cause* verbal and active participation of team members- verify:  Patient name:  YES  Procedure to be performed:  YES  Site, laterality and level, noting patient position:  YES    Above steps completed as applicable (Electronic Signature, Title, Date):    Martha Paez RN on 9/19/2023 at 11:26 AM      Note:  Any incidents of wrong patient, wrong procedure, or wrong site are reported using the Occurrence Process already in place.  The occurrence form is required to be completed immediately with this type of event.

## 2023-11-14 ENCOUNTER — PATIENT OUTREACH (OUTPATIENT)
Dept: OTOLARYNGOLOGY | Facility: CLINIC | Age: 63
End: 2023-11-14
Payer: COMMERCIAL

## 2023-11-14 NOTE — PROGRESS NOTES
Called patient and asked him if he would like to keep botox injection or postpone. Per patient his voice is doing well and he would like to postpone the injection. Patient was agreeable to alternative date and time. Patient is rescheduled and will reach out if symptoms start to worsen and he would like to get in sooner. Martha Paez RN on 11/14/2023 at 12:19 PM

## 2023-11-14 NOTE — PROGRESS NOTES
Called patient to check on botox injection and if patient would like to keep their scheduled botox injection as scheduled or push it out. LVM and asked patient to call back, provided direct number. Martha Paez RN on 11/14/2023 at 8:51 AM

## 2024-01-16 ENCOUNTER — TELEPHONE (OUTPATIENT)
Dept: OTOLARYNGOLOGY | Facility: CLINIC | Age: 64
End: 2024-01-16
Payer: COMMERCIAL

## 2024-01-17 NOTE — TELEPHONE ENCOUNTER
Spoke to patient and scheduled botox appt with DrGoding while DrGray is out on leave. Pt verbalized understanding of information given and appreciative of call.

## 2024-06-03 DIAGNOSIS — J38.3 SPASMODIC DYSPHONIA: Primary | ICD-10-CM

## 2024-06-18 ENCOUNTER — OFFICE VISIT (OUTPATIENT)
Dept: OTOLARYNGOLOGY | Facility: CLINIC | Age: 64
End: 2024-06-18
Payer: COMMERCIAL

## 2024-06-18 VITALS — BODY MASS INDEX: 30.1 KG/M2 | WEIGHT: 215 LBS | HEIGHT: 71 IN

## 2024-06-18 DIAGNOSIS — J38.3 OTHER DISEASES OF VOCAL CORDS: ICD-10-CM

## 2024-06-18 DIAGNOSIS — J38.5 LARYNGEAL SPASM: Primary | ICD-10-CM

## 2024-06-18 PROCEDURE — 64617 CHEMODENER MUSCLE LARYNX EMG: CPT | Mod: 50 | Performed by: OTOLARYNGOLOGY

## 2024-06-18 NOTE — PATIENT INSTRUCTIONS
1.  You were seen in the ENT Clinic today by . If you have any questions or concerns after your appointment, please call [phone]. Press option #1 for scheduling related needs. Press option #3 for Nurse advice.    2.   has recommended the following:   - follow up with  for next injection    3.  Plan is to return to clinic 10/8/24 for repeat botox injection      Johanna Coelho LPN  [phone]  [facility] - Otolaryngology

## 2024-06-18 NOTE — PROGRESS NOTES
Dennys Odom is a 63 year old male with a history of adductor laryngeal dystonia and laryngeal spasm.  He is a patient of Dr. Christianson's who I am seeing while she is on maternity leave.  Prior injections include are noted on her 9/19/2023 note.     He was last injected on 9/19/2024 by Dr. Christianson. he had a good response to the last injection. The last dose given was 0.75 units into each thyroarytenoid muscle.  After the injection  he had a breathy dysphonia for 4-5 weeks.There was no Dysphagia or Dyspnea.  The response lasted for 8 months.  She is interested in decreasing the amount of breathiness and therefore we will decrease his dosage slightly.  Our plan is to give 0.5 units of Botulinum A toxin into  each thyroarytenoid muscle today.      PROCEDURE: After obtaining consent, the patient was placed in the supine position. Ground and reference electrodes were applied. The anterior neck was cleaned with an alcohol swab.  Using a 27-gauge, unipolar electromyography needle, the larynx was entered through the cricothyroid space.  0.5 units of botulinum A toxin was injected into each thyroarytenoid muscle.  There was a Strong EMG response to phonation on the left side and a Strong EMG response to phonation on the right side.  The total amount of botulinum A toxin delivered today was 1.0 units. An additional 99 units of botulinum A toxin was necessarily wasted in preparation for the injection. he tolerated the procedure well and left the clinic after a short observation period.         The EMG was necessary specifically in this case to identify areas of muscle overactivity as well as to access the thyroarytenoid muscle which is beneath the thyroid cartilage and is not otherwise directly accessible without EMG guidance.    PLAN: I will have him  follow up with Dr. Christianson on a PRN basis. It is anticipated that repeat injections will be required over the long term.

## 2024-06-18 NOTE — LETTER
6/18/2024       RE: Dennys Odom  [address] [facility] [address]     Dear Colleague,    Thank you for referring your patient, Dennys Odom, to the [facility] [place] at [facility]. Please see a copy of my visit note below.    Dennys Odom is a 63 year old male with a history of adductor laryngeal dystonia and laryngeal spasm.  He is a patient of Dr. Christianson's who I am seeing while she is on maternity leave.  Prior injections include are noted on her 9/19/2023 note.     He was last injected on 9/19/2024 by Dr. Christianson. he had a good response to the last injection. The last dose given was 0.75 units into each thyroarytenoid muscle.  After the injection  he had a breathy dysphonia for 4-5 weeks.There was no Dysphagia or Dyspnea.  The response lasted for 8 months.  She is interested in decreasing the amount of breathiness and therefore we will decrease his dosage slightly.  Our plan is to give 0.5 units of Botulinum A toxin into  each thyroarytenoid muscle today.    PROCEDURE: After obtaining consent, the patient was placed in the supine position. Ground and reference electrodes were applied. The anterior neck was cleaned with an alcohol swab.  Using a 27-gauge, unipolar electromyography needle, the larynx was entered through the cricothyroid space.  0.5 units of botulinum A toxin was injected into each thyroarytenoid muscle.  There was a Strong EMG response to phonation on the left side and a Strong EMG response to phonation on the right side.  The total amount of botulinum A toxin delivered today was 1.0 units. An additional 99 units of botulinum A toxin was necessarily wasted in preparation for the injection. he tolerated the procedure well and left the clinic after a short observation period.       The EMG was necessary specifically in this case to identify areas of muscle overactivity as well as to  access the thyroarytenoid muscle which is beneath the thyroid cartilage and is not otherwise directly accessible without EMG guidance.    PLAN: I will have him  follow up with Dr. Christianson on a PRN basis. It is anticipated that repeat injections will be required over the long term.    Sincerely,  Michael La MD

## 2024-09-25 DIAGNOSIS — J38.5 LARYNGEAL SPASM: Primary | ICD-10-CM

## 2025-02-11 ENCOUNTER — TELEPHONE (OUTPATIENT)
Dept: OTOLARYNGOLOGY | Facility: CLINIC | Age: 65
End: 2025-02-11
Payer: COMMERCIAL

## 2025-02-11 NOTE — TELEPHONE ENCOUNTER
Spoke to patient in regards to scheduling a botox injection with provider as requested in message. Advised pt that provider is no longer available for botox injections on Tuesdays as previously due to a template change and her next date is booked. Advised pt that writer would need to discuss with provider to determine if she would be able to accommodate an injection before patient leaves the country on march 10th. Pt is also agreeable to seeing another provider if that is an option. Writer advised that she would reach out after discussing with provider with a plan. Pt agreeable to plan and very appreciative of call.

## 2025-02-25 ENCOUNTER — OFFICE VISIT (OUTPATIENT)
Dept: OTOLARYNGOLOGY | Facility: CLINIC | Age: 65
End: 2025-02-25
Payer: COMMERCIAL

## 2025-02-25 DIAGNOSIS — J38.5 LARYNGEAL SPASM: Primary | ICD-10-CM

## 2025-02-25 DIAGNOSIS — J38.3 OTHER DISEASES OF VOCAL CORDS: ICD-10-CM

## 2025-02-25 PROCEDURE — 64617 CHEMODENER MUSCLE LARYNX EMG: CPT | Mod: RT | Performed by: OTOLARYNGOLOGY

## 2025-02-25 NOTE — LETTER
2/25/2025       RE: Dennys Odom  [address] [address]       Dear Colleague,    Thank you for referring your patient, Dennys Odom, to the [facility] NOSE [facility] THROAT CLIN[facility] [place] at [facility]. Please see a copy of my visit note below.    Dennys Odom is a 64 year old male with a history of adductor laryngeal dystonia and laryngeal spasm. He is a patient of Dr. Christianson's who I saw while she is on maternity leave.  He did not schedule a follow-up with Dr. Christianson and he was unable to be fit into her schedule on short notice.  I am seeing him again today as a convenience.  He was last injected on 6/18/2024. he had a good response to the last injection. The last dose given was 0.5 units into each thyroarytenoid muscle.  This was a reduction in his dosage due to prolonged breathiness.  After the injection  he had a breathy dysphonia for 2-3 weeks.There was no Dysphagia or Dyspnea.  The response lasted for 8 months.  He was okay with his balance of affect and side effects and we will keep our dosage the same today.   Our plan is to give 0.5 units of Botulinum A toxin into  each thyroarytenoid muscle today.    PROCEDURE: After obtaining consent, the patient was placed in the supine position. Ground and reference electrodes were applied. The anterior neck was cleaned with an alcohol swab.  Using a 27-gauge, unipolar electromyography needle, the larynx was entered through the cricothyroid space.  0.5 units of botulinum A toxin was injected into each thyroarytenoid muscle.  There was a Strong EMG response to phonation on the left side and a Strong EMG response to phonation on the right side.  The total amount of botulinum A toxin delivered today was 1.0 units. An additional 99 units of botulinum A toxin was necessarily wasted in preparation for the injection. he tolerated the procedure well and left the clinic after a short  observation period.       The EMG was necessary specifically in this case to identify areas of muscle overactivity as well as to access the thyroarytenoid muscle which is beneath the thyroid cartilage and is not otherwise directly accessible without EMG guidance.    PLAN: I will have him  follow up with  on a PRN basis. It is anticipated that repeat injections will be required over the long term.        Again, thank you for allowing me to participate in the care of your patient.      Sincerely,    Michael La MD

## 2025-02-25 NOTE — PATIENT INSTRUCTIONS
1.  You were seen in the ENT Clinic today by . If you have any questions or concerns after your appointment, please call [phone]. Press option #1 for scheduling related needs. Press option #3 for Nurse advice.    2.   has recommended the following:   - follow up with DrGtatoy for next injection    3.  Plan is to return to clinic 10/10/25      Johanna Coelho LPN  [phone]  [facility] - Otolaryngology

## 2025-02-25 NOTE — PROGRESS NOTES
Dennys Odom is a 64 year old male with a history of adductor laryngeal dystonia and laryngeal spasm. He is a patient of Dr. Christianson's who I saw while she is on maternity leave.  He did not schedule a follow-up with Dr. Christianson and he was unable to be fit into her schedule on short notice.  I am seeing him again today as a convenience.  He was last injected on 6/18/2024. he had a good response to the last injection. The last dose given was 0.5 units into each thyroarytenoid muscle.  This was a reduction in his dosage due to prolonged breathiness.  After the injection  he had a breathy dysphonia for 2-3 weeks.There was no Dysphagia or Dyspnea.  The response lasted for 8 months.  He was okay with his balance of affect and side effects and we will keep our dosage the same today.   Our plan is to give 0.5 units of Botulinum A toxin into  each thyroarytenoid muscle today.      PROCEDURE: After obtaining consent, the patient was placed in the supine position. Ground and reference electrodes were applied. The anterior neck was cleaned with an alcohol swab.  Using a 27-gauge, unipolar electromyography needle, the larynx was entered through the cricothyroid space.  0.5 units of botulinum A toxin was injected into each thyroarytenoid muscle.  There was a Strong EMG response to phonation on the left side and a Strong EMG response to phonation on the right side.  The total amount of botulinum A toxin delivered today was 1.0 units. An additional 99 units of botulinum A toxin was necessarily wasted in preparation for the injection. he tolerated the procedure well and left the clinic after a short observation period.         The EMG was necessary specifically in this case to identify areas of muscle overactivity as well as to access the thyroarytenoid muscle which is beneath the thyroid cartilage and is not otherwise directly accessible without EMG guidance.    PLAN: I will have him  follow up with  on a PRN basis. It is  anticipated that repeat injections will be required over the long term.

## 2025-07-12 ENCOUNTER — HEALTH MAINTENANCE LETTER (OUTPATIENT)
Age: 65
End: 2025-07-12

## (undated) DEVICE — NDL SCLEROTHERAPY 1.8MM 26GA 200CM M00518160

## (undated) DEVICE — SUCTION MANIFOLD NEPTUNE 2 SYS 4 PORT 0702-020-000

## (undated) DEVICE — NDL 18GA 1.5" 305196

## (undated) DEVICE — KIT ENDO FIRST STEP DISINFECTANT 200ML W/POUCH EP-4

## (undated) DEVICE — NDL 30GA 0.5" 305106

## (undated) DEVICE — SUCTION TIP YANKAUER STR K87

## (undated) DEVICE — ENDO VALVE BX EVIS MAJ-210

## (undated) DEVICE — ANTIFOG SOLUTION W/FOAM PAD CF-1001

## (undated) DEVICE — GLOVE PROTEXIS POWDER FREE SMT 6.5  2D72PT65X

## (undated) DEVICE — SOL WATER IRRIG 500ML BOTTLE 2F7113

## (undated) DEVICE — TUBING SUCTION 10'X3/16" N510

## (undated) DEVICE — ENDO VALVE SUCTION BRONCH EVIS MAJ-209

## (undated) DEVICE — SYR 01ML TBC SLIP TIP W/O NDL

## (undated) DEVICE — LINEN TOWEL PACK X5 5464

## (undated) DEVICE — SYR 03ML LL W/O NDL 309657

## (undated) DEVICE — CUP AND LID 2PK 2OZ STERILE  SSK9006A

## (undated) DEVICE — SYR 10ML SLIP TIP W/O NDL 303134

## (undated) DEVICE — PEN MARKING SKIN W/LABELS 31145884

## (undated) DEVICE — SPONGE COTTONOID 1/2X3" 20-07S

## (undated) DEVICE — DRSG GAUZE 4X4" 3033

## (undated) DEVICE — JELLY LUBRICATING SURGILUBE 2OZ TUBE

## (undated) RX ORDER — LIDOCAINE HYDROCHLORIDE 20 MG/ML
SOLUTION OROPHARYNGEAL
Status: DISPENSED
Start: 2023-01-31

## (undated) RX ORDER — OXYMETAZOLINE HYDROCHLORIDE 0.05 G/100ML
SPRAY NASAL
Status: DISPENSED
Start: 2022-09-27

## (undated) RX ORDER — TRIAMCINOLONE ACETONIDE 40 MG/ML
INJECTION, SUSPENSION INTRA-ARTICULAR; INTRAMUSCULAR
Status: DISPENSED
Start: 2022-09-27

## (undated) RX ORDER — LIDOCAINE HYDROCHLORIDE AND EPINEPHRINE 10; 10 MG/ML; UG/ML
INJECTION, SOLUTION INFILTRATION; PERINEURAL
Status: DISPENSED
Start: 2022-09-27

## (undated) RX ORDER — LIDOCAINE HYDROCHLORIDE 40 MG/ML
SOLUTION TOPICAL
Status: DISPENSED
Start: 2022-09-27